# Patient Record
Sex: FEMALE | NOT HISPANIC OR LATINO | Employment: FULL TIME | URBAN - METROPOLITAN AREA
[De-identification: names, ages, dates, MRNs, and addresses within clinical notes are randomized per-mention and may not be internally consistent; named-entity substitution may affect disease eponyms.]

---

## 2017-04-12 ENCOUNTER — RADIANT APPOINTMENT (OUTPATIENT)
Dept: GENERAL RADIOLOGY | Facility: CLINIC | Age: 37
End: 2017-04-12
Attending: FAMILY MEDICINE
Payer: COMMERCIAL

## 2017-04-12 ENCOUNTER — HOSPITAL ENCOUNTER (OUTPATIENT)
Dept: ULTRASOUND IMAGING | Facility: CLINIC | Age: 37
Discharge: HOME | End: 2017-04-12
Attending: FAMILY MEDICINE | Admitting: FAMILY MEDICINE
Payer: COMMERCIAL

## 2017-04-12 ENCOUNTER — OFFICE VISIT (OUTPATIENT)
Dept: FAMILY MEDICINE | Facility: CLINIC | Age: 37
End: 2017-04-12
Payer: COMMERCIAL

## 2017-04-12 VITALS
RESPIRATION RATE: 16 BRPM | OXYGEN SATURATION: 99 % | BODY MASS INDEX: 32.2 KG/M2 | DIASTOLIC BLOOD PRESSURE: 89 MMHG | HEIGHT: 62 IN | TEMPERATURE: 98.4 F | HEART RATE: 66 BPM | WEIGHT: 175 LBS | SYSTOLIC BLOOD PRESSURE: 129 MMHG

## 2017-04-12 DIAGNOSIS — M79.661 PAIN OF RIGHT LOWER LEG: ICD-10-CM

## 2017-04-12 DIAGNOSIS — M54.50 ACUTE BILATERAL LOW BACK PAIN WITHOUT SCIATICA: Primary | ICD-10-CM

## 2017-04-12 DIAGNOSIS — M54.50 ACUTE BILATERAL LOW BACK PAIN WITHOUT SCIATICA: ICD-10-CM

## 2017-04-12 PROBLEM — I83.91: Status: ACTIVE | Noted: 2017-04-12

## 2017-04-12 PROCEDURE — 72100 X-RAY EXAM L-S SPINE 2/3 VWS: CPT

## 2017-04-12 PROCEDURE — 99204 OFFICE O/P NEW MOD 45 MIN: CPT | Performed by: FAMILY MEDICINE

## 2017-04-12 PROCEDURE — 93971 EXTREMITY STUDY: CPT | Mod: RT

## 2017-04-12 RX ORDER — NAPROXEN 500 MG/1
500 TABLET ORAL 2 TIMES DAILY WITH MEALS
Qty: 60 TABLET | Refills: 1 | Status: SHIPPED | OUTPATIENT
Start: 2017-04-12 | End: 2017-09-06

## 2017-04-12 NOTE — NURSING NOTE
"Chief Complaint   Patient presents with     Pain     lower back and right leg pain     New Patient       Initial /89 (BP Location: Right arm, Patient Position: Chair, Cuff Size: Adult Regular)  Pulse 66  Temp 98.4  F (36.9  C) (Oral)  Resp 16  Ht 5' 2\" (1.575 m)  Wt 175 lb (79.4 kg)  SpO2 99%  BMI 32.01 kg/m2 Estimated body mass index is 32.01 kg/(m^2) as calculated from the following:    Height as of this encounter: 5' 2\" (1.575 m).    Weight as of this encounter: 175 lb (79.4 kg).  Medication Reconciliation: complete   Latonya Ruelas, LEENA      "

## 2017-04-12 NOTE — Clinical Note
Please abstract the following data from this visit with this patient into the appropriate field in Epic:  Pap smear done on this date: 2/2016 (approximately), by this group: [facility], results were normal.

## 2017-04-12 NOTE — MR AVS SNAPSHOT
After Visit Summary   4/12/2017    Lorena Conway    MRN: [id number]           Patient Information     Date Of Birth          1980        Visit Information        Provider Department      4/12/2017 10:00 AM Sergo Adams MD [facility]        Today's Diagnoses     Acute bilateral low back pain without sciatica    -  1    Pain of right lower leg           Follow-ups after your visit        Your next 10 appointments already scheduled     Apr 12, 2017  2:00 PM CDT   US LOWER EXTREMITY VENOUS DUPLEX RIGHT with [id number]   [facility] ([facility])    [address]  [facility] [address] [phone]           Please bring a list of your medicines (including vitamins, minerals and over-the-counter drugs). Also, tell your doctor about any allergies you may have. Wear comfortable clothes and leave your valuables at home.  You do not need to do anything special to prepare for your exam.  Please call the Imaging Department at your exam site with any questions.              Future tests that were ordered for you today     Open Future Orders        Priority Expected Expires Ordered    US Lower Extremity Venous Duplex Right STAT  4/12/2018 4/12/2017            Who to contact     If you have questions or need follow up information about today's clinic visit or your schedule please contact [facility] directly at [phone].  Normal or non-critical lab and imaging results will be communicated to you by MyChart, letter or phone within 4 business days after the clinic has received the results. If you do not hear from us within 7 days, please contact the clinic through MyChart or phone. If you have a critical or abnormal lab result, we will notify you by phone as soon as possible.  Submit refill requests through [employer] or call your pharmacy and they will forward the refill request to us. Please allow 3 business days  "for your refill to be completed.          Additional Information About Your Visit        Power Assurehart Information     Inaika lets you send messages to your doctor, view your test results, renew your prescriptions, schedule appointments and more. To sign up, go to [email or web address] . Click on \"Log in\" on the left side of the screen, which will take you to the Welcome page. Then click on \"Sign up Now\" on the right side of the page.     You will be asked to enter the access code listed below, as well as some personal information. Please follow the directions to create your username and password.     Your access code is: [email or web address]  Expires: 2017 11:25 AM     Your access code will  in 90 days. If you need help or a new code, please call your [facility] clinic or [phone].        Care EveryWhere ID     This is your Care EveryWhere ID. This could be used by other organizations to access your [facility] medical records  [id number]        Your Vitals Were     Pulse Temperature Respirations Height Pulse Oximetry BMI (Body Mass Index)    66 98.4  F (36.9  C) (Oral) 16 5' 2\" (1.575 m) 99% 32.01 kg/m2       Blood Pressure from Last 3 Encounters:   17 129/89    Weight from Last 3 Encounters:   17 175 lb (79.4 kg)                 Today's Medication Changes          These changes are accurate as of: 17 11:25 AM.  If you have any questions, ask your nurse or doctor.               Start taking these medicines.        Dose/Directions    naproxen 500 MG tablet   Commonly known as:  NAPROSYN   Used for:  Pain of right lower leg   Started by:  Sergo Adams MD        Dose:  500 mg   Take 1 tablet (500 mg) by mouth 2 times daily (with meals)   Quantity:  60 tablet   Refills:  1            Where to get your medicines      These medications were sent to [id number] - [place], [place] - [address]  [address] [facility] [address]    Hours:  9Am-9Pm (M-F) 9Am-6Pm (S&S) Phone:  [phone] "     naproxen 500 MG tablet                Primary Care Provider    None Specified       No primary provider on file.        Thank you!     Thank you for choosing [facility]  for your care. Our goal is always to provide you with excellent care. Hearing back from our patients is one way we can continue to improve our services. Please take a few minutes to complete the written survey that you may receive in the mail after your visit with us. Thank you!             Your Updated Medication List - Protect others around you: Learn how to safely use, store and throw away your medicines at www.disposemymeds.org.          This list is accurate as of: 4/12/17 11:25 AM.  Always use your most recent med list.                   Brand Name Dispense Instructions for use    naproxen 500 MG tablet    NAPROSYN    60 tablet    Take 1 tablet (500 mg) by mouth 2 times daily (with meals)

## 2017-04-12 NOTE — PROGRESS NOTES
SUBJECTIVE:                                                    Lorena Conway is a 36 year old female who presents to clinic today for the following health issues:      Back Pain      Duration: 2 weeks        Specific cause: none    Description:   Location of pain: low back bilateral  Character of pain: sharp  Pain radiation:radiates into the right leg  New numbness or weakness in legs, not attributed to pain:  no     Intensity: Currently 1/10, At its worst 5/10, moderate    History:   Pain interferes with job: YES  History of back problems: no prior back problems  Any previous MRI or X-rays: None  Pt rememrber about 1.5 year ago, she passed out standing and hit her back on the edge of the bed.  Sees a specialist for back pain:  No  Therapies tried without relief: none    Alleviating factors:   Improved by: heat      Precipitating factors:  Worsened by: Lifting, Bending, Standing and Sitting    Functional and Psychosocial Screen (Tamika STarT Back):      Not performed today       Accompanying Signs & Symptoms:  Risk of Fracture:  None  Risk of Cauda Equina:  None  Risk of Infection:  None  Risk of Cancer:  None  Risk of Ankylosing Spondylitis:  Onset at age <35, male, AND morning back stiffness. no                          Problem list and histories reviewed & adjusted, as indicated.  Additional history: as documented    There is no problem list on file for this patient.    No past surgical history on file.    Social History   Substance Use Topics     Smoking status: Never Smoker     Smokeless tobacco: Never Used     Alcohol use No     History reviewed. No pertinent family history.      No current outpatient prescriptions on file.     No Known Allergies    Reviewed and updated as needed this visit by clinical staff  Tobacco  Allergies  Fam Hx  Soc Hx      Reviewed and updated as needed this visit by Provider         ROS:  C: NEGATIVE for fever, chills, change in weight  NEURO: NEGATIVE for weakness, dizziness or  "paresthesias    OBJECTIVE:                                                    /89 (BP Location: Right arm, Patient Position: Chair, Cuff Size: Adult Regular)  Pulse 66  Temp 98.4  F (36.9  C) (Oral)  Resp 16  Ht 5' 2\" (1.575 m)  Wt 175 lb (79.4 kg)  SpO2 99%  BMI 32.01 kg/m2  Body mass index is 32.01 kg/(m^2).  Patient appears to be in no pain, no antalgic gait noted.   Lumbosacral spine area reveals mild rt side local tenderness or mass.    ROM of the LS spine showed normal.extension nl flexionnl   Straight leg raise is negative at 60 degrees on bilateral.  L4 :toe walk nl patellar reflux nl medial foot sensation nl  L5: dorsiflexion of the big toe nl,mid foot sensation nl  S1: heel walk nl, Quincy reflex nl, lateral sensation of the foot nl     Peripheral pulses are palpable.   Leg : tenderness on the mid section of the calf of the Rt leg.         ASSESSMENT/PLAN:                                                            1. Acute bilateral low back pain without sciatica  Mostly musculoskeletal, given the hx of injury in the past, will refer to   - XR Lumbar Spine 2/3 Views; Future  Meanwhile, start on naprosyn twice daily, and use good back hygiene.    2. Pain of right lower leg  Mostly related to post varicose surgery, will check with   - US Lower Extremity Venous Duplex Right; Future  Follow up with the results, if they are negative, pt to contact her surgeon   - naproxen (NAPROSYN) 500 MG tablet; Take 1 tablet (500 mg) by mouth 2 times daily (with meals)  Dispense: 60 tablet; Refill: 1        Sergo Adams MD  [facility]"

## 2017-09-06 ENCOUNTER — OFFICE VISIT (OUTPATIENT)
Dept: FAMILY MEDICINE | Facility: CLINIC | Age: 37
End: 2017-09-06
Payer: COMMERCIAL

## 2017-09-06 VITALS
SYSTOLIC BLOOD PRESSURE: 117 MMHG | RESPIRATION RATE: 16 BRPM | DIASTOLIC BLOOD PRESSURE: 80 MMHG | OXYGEN SATURATION: 100 % | TEMPERATURE: 98.6 F | WEIGHT: 169 LBS | HEIGHT: 62 IN | HEART RATE: 56 BPM | BODY MASS INDEX: 31.1 KG/M2

## 2017-09-06 DIAGNOSIS — M79.661 PAIN OF RIGHT LOWER LEG: ICD-10-CM

## 2017-09-06 DIAGNOSIS — M72.2 PLANTAR FASCIITIS: Primary | ICD-10-CM

## 2017-09-06 PROBLEM — E66.9 NON MORBID OBESITY: Status: ACTIVE | Noted: 2017-09-06

## 2017-09-06 LAB — D DIMER PPP FEU-MCNC: 0.2 UG/ML FEU (ref 0–0.5)

## 2017-09-06 PROCEDURE — 99214 OFFICE O/P EST MOD 30 MIN: CPT | Performed by: FAMILY MEDICINE

## 2017-09-06 PROCEDURE — 85379 FIBRIN DEGRADATION QUANT: CPT | Performed by: FAMILY MEDICINE

## 2017-09-06 PROCEDURE — 36415 COLL VENOUS BLD VENIPUNCTURE: CPT | Performed by: FAMILY MEDICINE

## 2017-09-06 NOTE — MR AVS SNAPSHOT
"              After Visit Summary   2017    Lorena Conway    MRN: [id number]           Patient Information     Date Of Birth          1980        Visit Information        Provider Department      2017 10:45 AM Sergo Adams MD [facility]        Today's Diagnoses     Plantar fasciitis    -  1    Pain of right lower leg           Follow-ups after your visit        Who to contact     If you have questions or need follow up information about today's clinic visit or your schedule please contact [facility] directly at [phone].  Normal or non-critical lab and imaging results will be communicated to you by PowerGenixhart, letter or phone within 4 business days after the clinic has received the results. If you do not hear from us within 7 days, please contact the clinic through PowerGenixhart or phone. If you have a critical or abnormal lab result, we will notify you by phone as soon as possible.  Submit refill requests through OneCard or call your pharmacy and they will forward the refill request to us. Please allow 3 business days for your refill to be completed.          Additional Information About Your Visit        MyChart Information     OneCard lets you send messages to your doctor, view your test results, renew your prescriptions, schedule appointments and more. To sign up, go to [email or web address] . Click on \"Log in\" on the left side of the screen, which will take you to the Welcome page. Then click on \"Sign up Now\" on the right side of the page.     You will be asked to enter the access code listed below, as well as some personal information. Please follow the directions to create your username and password.     Your access code is: [email or web address]  Expires: 2017 11:10 AM     Your access code will  in 90 days. If you need help or a new code, please call your [facility] or [phone].        Care EveryWhere ID     This is your Care EveryWhere ID. This could be " "used by other organizations to access your [facility] medical records  [id number]        Your Vitals Were     Pulse Temperature Respirations Height Pulse Oximetry BMI (Body Mass Index)    56 98.6  F (37  C) (Oral) 16 5' 2\" (1.575 m) 100% 30.91 kg/m2       Blood Pressure from Last 3 Encounters:   09/06/17 117/80   04/12/17 129/89    Weight from Last 3 Encounters:   09/06/17 169 lb (76.7 kg)   04/12/17 175 lb (79.4 kg)              We Performed the Following     D dimer quantitative          Today's Medication Changes          These changes are accurate as of: 9/6/17 11:10 AM.  If you have any questions, ask your nurse or doctor.               Start taking these medicines.        Dose/Directions    * order for DME   Used for:  Plantar fasciitis   Started by:  Sergo Adams MD        Equipment being ordered: heel cups   Quantity:  1 Device   Refills:  0       * order for DME   Used for:  Pain of right lower leg   Started by:  Sergo Adams MD        Equipment being ordered: compression stocking knee high, medium size, pressure 15 mm/hg.   Quantity:  3 Device   Refills:  11       * Notice:  This list has 2 medication(s) that are the same as other medications prescribed for you. Read the directions carefully, and ask your doctor or other care provider to review them with you.         Where to get your medicines      Some of these will need a paper prescription and others can be bought over the counter.  Ask your nurse if you have questions.     Bring a paper prescription for each of these medications     order for DME    order for DME                Primary Care Provider    None Specified       No primary provider on file.        Equal Access to Services     [facility] AH: Hadcandi baker Soriana, waaxda luqadaha, qaybta kaalmada kendellda, ajit lui. [facility] [phone].    ATENCIÓN: Si habla español, tiene a diaz disposición servicios gratuitos de asistencia lingüística. Llame al " [phone].    We comply with applicable federal civil rights laws and [place] laws. We do not discriminate on the basis of race, color, national origin, age, disability sex, sexual orientation or gender identity.            Thank you!     Thank you for choosing [facility]  for your care. Our goal is always to provide you with excellent care. Hearing back from our patients is one way we can continue to improve our services. Please take a few minutes to complete the written survey that you may receive in the mail after your visit with us. Thank you!             Your Updated Medication List - Protect others around you: Learn how to safely use, store and throw away your medicines at www.disposemymeds.org.          This list is accurate as of: 9/6/17 11:10 AM.  Always use your most recent med list.                   Brand Name Dispense Instructions for use Diagnosis    * order for DME     1 Device    Equipment being ordered: heel cups    Plantar fasciitis       * order for DME     3 Device    Equipment being ordered: compression stocking knee high, medium size, pressure 15 mm/hg.    Pain of right lower leg       * Notice:  This list has 2 medication(s) that are the same as other medications prescribed for you. Read the directions carefully, and ask your doctor or other care provider to review them with you.

## 2017-09-06 NOTE — NURSING NOTE
"Chief Complaint   Patient presents with     RECHECK     right leg pain x3 weeks       Initial /80 (BP Location: Right arm, Patient Position: Chair, Cuff Size: Adult Regular)  Pulse 56  Temp 98.6  F (37  C) (Oral)  Resp 16  Ht 5' 2\" (1.575 m)  Wt 169 lb (76.7 kg)  SpO2 100%  BMI 30.91 kg/m2 Estimated body mass index is 30.91 kg/(m^2) as calculated from the following:    Height as of this encounter: 5' 2\" (1.575 m).    Weight as of this encounter: 169 lb (76.7 kg).  Medication Reconciliation: complete   Latonya Ruelas, LEENA      "

## 2017-09-06 NOTE — PROGRESS NOTES
SUBJECTIVE:   Lorena Conway is a 37 year old female who presents to clinic today for the following health issues:      Foot pain.       Type / Location of Pain: right leg and foot pain.  Analgesia/pain control:       Recent changes:  same      Overall control: Tolerable with discomfort  Activity level/function:      Daily activities:  Able to do light housework, cooking    Work:  Pain does not limit any  work  Adverse effects:  No  Adherance    Taking medication as directed?  n/a    Participating in other treatments: not applicable  Risk Factors:    Sleep:  Good    Mood/anxiety:  controlled    Recent family or social stressors:  none noted    Other aggravating factors: prolonged standing  No flowsheet data found.  No flowsheet data found.  Encounter-Level CSA:     There are no encounter-level csa.            Amount of exercise or physical activity: 4-5 days/week for an average of greater than 60 minutes    Problems taking medications regularly: n/a    Medication side effects: not applicable  Diet: regular (no restrictions)          Problem list and histories reviewed & adjusted, as indicated.  Additional history: as documented    Patient Active Problem List   Diagnosis     Asymptomatic varicose veins, right     History reviewed. No pertinent surgical history.    Social History   Substance Use Topics     Smoking status: Never Smoker     Smokeless tobacco: Never Used     Alcohol use No     History reviewed. No pertinent family history.      Current Outpatient Prescriptions   Medication Sig Dispense Refill     order for DME Equipment being ordered: heel cups 1 Device 0     order for DME Equipment being ordered: compression stocking knee high, medium size, pressure 15 mm/hg. 3 Device 11     No Known Allergies      Reviewed and updated as needed this visit by clinical staffTobacco  Allergies  Med Hx  Surg Hx  Fam Hx  Soc Hx      Reviewed and updated as needed this visit by Provider         ROS:  C: NEGATIVE for  "fever, chills, change in weight  R: NEGATIVE for significant cough or SOB  CV: NEGATIVE for chest pain, palpitations or peripheral edema    OBJECTIVE:     /80 (BP Location: Right arm, Patient Position: Chair, Cuff Size: Adult Regular)  Pulse 56  Temp 98.6  F (37  C) (Oral)  Resp 16  Ht 5' 2\" (1.575 m)  Wt 169 lb (76.7 kg)  SpO2 100%  BMI 30.91 kg/m2  Body mass index is 30.91 kg/(m^2).  GENERAL: healthy, alert and no distress  RESP: lungs clear to auscultation - no rales, rhonchi or wheezes  MS: there is tenderness on the Rt calf area, post surgical hyperpigmented lesions.  SKIN: there is a small callus noticed on the Rt heel.  Mild tenderness over the Rt heel.        ASSESSMENT/PLAN:             1. Plantar fasciitis  Talked about ways to improve symptoms, including avoid walking barefooted at home, use comfortable shoes, weight loss, may use cold massage on the feet at home.  - order for DME; Equipment being ordered: heel cups  Dispense: 1 Device; Refill: 0    2. Pain of right lower leg  Mostly related to venous stasis, will start on compression stockings, also will check for   - D dimer quantitative to rule out DVT, unlikely.  Also talked about elveating the legs   - order for DME; Equipment being ordered: compression stocking knee high, medium size, pressure 15 mm/hg.  Dispense: 3 Device; Refill: 11    Follow up in 30 days if symptoms persist, sooner if symptoms worsen or new ones develops, pt may contact us over the phone for any questions or concerns.      Sergo Adams MD  [facility]"

## 2017-09-06 NOTE — LETTER
September 7, 2017      Lorena Conway  [address] [address]        Dear ,    We are writing to inform you of your test results.    Test for blood clot is negative.      Resulted Orders   D dimer quantitative   Result Value Ref Range    D Dimer 0.2 0.0 - 0.50 ug/ml FEU      Comment:      This D-dimer assay is intended for use in conjuntion with a clinical pretest   probability assessment model to exclude pulmonary embolism (PE) and as an aid   in the diagnosis of deep venous thrombosis (DVT) in outpatients suspected of   PE or DVT. The cut-off value is 0.5 g/mL FEU.         If you have any questions or concerns, please call the clinic at the number listed above.       Sincerely,        Sergo Adams MD

## 2018-01-31 ENCOUNTER — TELEPHONE (OUTPATIENT)
Dept: FAMILY MEDICINE | Facility: CLINIC | Age: 38
End: 2018-01-31

## 2018-01-31 ENCOUNTER — RADIANT APPOINTMENT (OUTPATIENT)
Dept: GENERAL RADIOLOGY | Facility: CLINIC | Age: 38
End: 2018-01-31
Attending: FAMILY MEDICINE
Payer: OTHER MISCELLANEOUS

## 2018-01-31 ENCOUNTER — OFFICE VISIT (OUTPATIENT)
Dept: FAMILY MEDICINE | Facility: CLINIC | Age: 38
End: 2018-01-31
Payer: OTHER MISCELLANEOUS

## 2018-01-31 VITALS
WEIGHT: 170 LBS | RESPIRATION RATE: 16 BRPM | BODY MASS INDEX: 31.28 KG/M2 | TEMPERATURE: 99 F | OXYGEN SATURATION: 100 % | HEIGHT: 62 IN | SYSTOLIC BLOOD PRESSURE: 120 MMHG | HEART RATE: 69 BPM | DIASTOLIC BLOOD PRESSURE: 81 MMHG

## 2018-01-31 DIAGNOSIS — M54.50 ACUTE BILATERAL LOW BACK PAIN WITHOUT SCIATICA: Primary | ICD-10-CM

## 2018-01-31 DIAGNOSIS — M54.50 ACUTE BILATERAL LOW BACK PAIN WITHOUT SCIATICA: ICD-10-CM

## 2018-01-31 PROCEDURE — 99214 OFFICE O/P EST MOD 30 MIN: CPT | Performed by: FAMILY MEDICINE

## 2018-01-31 PROCEDURE — 72100 X-RAY EXAM L-S SPINE 2/3 VWS: CPT

## 2018-01-31 RX ORDER — NAPROXEN 500 MG/1
500 TABLET ORAL 2 TIMES DAILY WITH MEALS
Qty: 60 TABLET | Refills: 0 | Status: SHIPPED | OUTPATIENT
Start: 2018-01-31 | End: 2018-02-26

## 2018-01-31 NOTE — PROGRESS NOTES
SUBJECTIVE:   Lorena Conway is a 37 year old female who presents to clinic today for the following health issues:      Back Pain       Duration: x3 months        Specific cause: work-related, pushing/pulling heavy items, she was lifting milk bossy, on Friday when she felt sever back pain.  She repeated the lifting on Saturday (next day) which made the pain worse.    Description:   Location of pain: low back bilateral  Character of pain: sharp, dull ache, fullness and cramping  Pain radiation:radiates into the right leg and radiates into the left leg  New numbness or weakness in legs, not attributed to pain:  no     Intensity: Currently 6/10, At its worst 10/10, moderate, severe    Some of the time, the pain is sever, but most of the days it is usually tolerable.    History:   Pain interferes with job: YES  History of back problems: no prior back problems  Any previous MRI or X-rays: at the chiropractor    Sees a specialist for back pain:  No  Therapies tried without relief: none    Alleviating factors:   Improved by: chiropractor  Treatment for 12 weeks with no improvement.    Precipitating factors:  Worsened by: Lifting, Bending, Standing and Sitting    Functional and Psychosocial Screen (Tamika STarT Back):      Not performed today          Accompanying Signs & Symptoms:  Risk of Fracture:  None  Risk of Cauda Equina:  None  Risk of Infection:  None  Risk of Cancer:  None  Risk of Ankylosing Spondylitis:  Onset at age <35, male, AND morning back stiffness. no                       Problem list and histories reviewed & adjusted, as indicated.  Additional history: as documented    Patient Active Problem List   Diagnosis     Asymptomatic varicose veins, right     Plantar fasciitis     Non morbid obesity     History reviewed. No pertinent surgical history.    Social History   Substance Use Topics     Smoking status: Never Smoker     Smokeless tobacco: Never Used     Alcohol use No     History reviewed. No pertinent  "family history.      Current Outpatient Prescriptions   Medication Sig Dispense Refill     naproxen (NAPROSYN) 500 MG tablet Take 1 tablet (500 mg) by mouth 2 times daily (with meals) 60 tablet 0       Reviewed and updated as needed this visit by clinical staff  Tobacco  Allergies  Meds  Med Hx  Surg Hx  Fam Hx  Soc Hx      Reviewed and updated as needed this visit by Provider         ROS:  C: NEGATIVE for fever, chills, change in weight  CV: NEGATIVE for chest pain, palpitations or peripheral edema    OBJECTIVE:     /81 (BP Location: Right arm, Patient Position: Chair, Cuff Size: Adult Large)  Pulse 69  Temp 99  F (37.2  C) (Oral)  Resp 16  Ht 5' 2\" (1.575 m)  Wt 170 lb (77.1 kg)  SpO2 100%  BMI 31.09 kg/m2  Body mass index is 31.09 kg/(m^2).  Patient appears to be in no pain, no antalgic gait noted.   Lumbosacral spine area reveals mild lower  local tenderness but no mass.    ROM of the LS spine showed normal.extension nl flexionnl   Straight leg raise is negative at 60 degrees on bilateral.  L4 :toe walk nlpatellar reflux nl medial foot sensation nl  L5: dorsiflexion of the big toe nl,mid foot sensation nl  S1: heel walk nl, Hardesty reflex not done, lateral sensation of the foot nl     Peripheral pulses are palpable.           ASSESSMENT/PLAN:             1. Acute bilateral low back pain without sciatica  Pain is not improving, will start on   - naproxen (NAPROSYN) 500 MG tablet; Take 1 tablet (500 mg) by mouth 2 times daily (with meals)  Dispense: 60 tablet; Refill: 0  - XR Lumbar Spine 2/3 Views; Future  Refer to   - [facility] PT, HAND, AND CHIROPRACTIC REFERRAL    Follow up in 30 days if symptoms persist, sooner if symptoms worsen or new ones develops, pt may contact us over the phone for any questions or concerns.        Sergo Adams MD  [facility]"

## 2018-01-31 NOTE — LETTER
[address] [address]  Phone: [phone]    January 31, 2018        Lorena Conway  [address]  [place] [facility] [facility] [address]          To whom it may concern:    RE: Lorena Conway    Patient was seen and treated today at our clinic.  Patient may return to work  with the following:  Light duty-unable to bend or twist, no lifting more than 10 pounds, no pushing or pulling more than 10 pounds.  When the patient returns to work, the following restrictions apply until 3/2/2018      Please contact me for questions or concerns.      Sincerely,        Sergo Adams MD

## 2018-01-31 NOTE — TELEPHONE ENCOUNTER
Pt calls, saw AA this am for w/c back issue, informs AA needs to cancel PT order per work comp adjustor, pt just completed 12 weeks of chiropractor so they won't pay for more, pt will call and cancel PT appointment next week, AA please cancel PT order if agree, w/c wants AA to order back specialist referral, aware AA maybe out not, routed, inform pt when final      Telephone Information:   Mobile [phone]     Tiffany Rashid RN, BSN  Message handled by Nurse Triage.

## 2018-01-31 NOTE — TELEPHONE ENCOUNTER
Oh no.   Ok, will refer to back specialist, I put the order in, they should be calling the patient.

## 2018-01-31 NOTE — MR AVS SNAPSHOT
After Visit Summary   1/31/2018    Lorena Conway    MRN: [id number]           Patient Information     Date Of Birth          1980        Visit Information        Provider Department      1/31/2018 10:00 AM Sergo Adams MD [facility]        Today's Diagnoses     Acute bilateral low back pain without sciatica    -  1       Follow-ups after your visit        Additional Services     [facility] PT, HAND, AND CHIROPRACTIC REFERRAL       **This order will print in the [facility] Scheduling Office**    Physical Therapy, Hand Therapy and Chiropractic Care are available through:    *[facility]  *[facility]  *[facility]    Call one number to schedule at any of the above locations: [phone].    Your provider has referred you to: Physical Therapy at [facility] or [facility]    Indication/Reason for Referral: low back pain.  Onset of Illness: 3 months ago.  Therapy Orders: Evaluate and Treat  Special Programs: None  Special Request: None    Tamika Lundberg      Additional Comments for the Therapist or Chiropractor:     Please be aware that coverage of these services is subject to the terms and limitations of your health insurance plan.  Call member services at your health plan with any benefit or coverage questions.      Please bring the following to your appointment:    *Your personal calendar for scheduling future appointments  *Comfortable clothing                  Future tests that were ordered for you today     Open Future Orders        Priority Expected Expires Ordered    XR Lumbar Spine 2/3 Views Routine 1/31/2018 1/31/2019 1/31/2018            Who to contact     If you have questions or need follow up information about today's clinic visit or your schedule please contact [facility] directly at [phone].  Normal or non-critical lab and imaging results will be communicated to you by MyChart, letter or phone within 4 business days  "after the clinic has received the results. If you do not hear from us within 7 days, please contact the clinic through [facility] or phone. If you have a critical or abnormal lab result, we will notify you by phone as soon as possible.  Submit refill requests through [employer] or call your pharmacy and they will forward the refill request to us. Please allow 3 business days for your refill to be completed.          Additional Information About Your Visit        [facility] Information     [facility] lets you send messages to your doctor, view your test results, renew your prescriptions, schedule appointments and more. To sign up, go to [email or web address] . Click on \"Log in\" on the left side of the screen, which will take you to the Welcome page. Then click on \"Sign up Now\" on the right side of the page.     You will be asked to enter the access code listed below, as well as some personal information. Please follow the directions to create your username and password.     Your access code is: [email or web address]  Expires: 2018 10:35 AM     Your access code will  in 90 days. If you need help or a new code, please call your [place] clinic or [phone].        Care EveryWhere ID     This is your Care EveryWhere ID. This could be used by other organizations to access your [place] medical records  [id number]        Your Vitals Were     Pulse Temperature Respirations Height Pulse Oximetry BMI (Body Mass Index)    69 99  F (37.2  C) (Oral) 16 5' 2\" (1.575 m) 100% 31.09 kg/m2       Blood Pressure from Last 3 Encounters:   18 120/81   17 117/80   17 129/89    Weight from Last 3 Encounters:   18 170 lb (77.1 kg)   17 169 lb (76.7 kg)   17 175 lb (79.4 kg)              We Performed the Following     IMER PT, HAND, AND CHIROPRACTIC REFERRAL          Today's Medication Changes          These changes are accurate as of 18 10:35 AM.  If you have any questions, ask your nurse or doctor.    "            Start taking these medicines.        Dose/Directions    naproxen 500 MG tablet   Commonly known as:  NAPROSYN   Used for:  Acute bilateral low back pain without sciatica   Started by:  Sergo Adams MD        Dose:  500 mg   Take 1 tablet (500 mg) by mouth 2 times daily (with meals)   Quantity:  60 tablet   Refills:  0            Where to get your medicines      These medications were sent to [facility]/pharmacy #[id number] - [place], [place] - [address]  [address], [facility] [address]     Phone:  [phone]     naproxen 500 MG tablet                Primary Care Provider Office Phone # Fax #    Sergo Adams [phone] [address] [facility] [address]        Equal Access to Services     [facility]: Marlyn remy hadalfredoo Soriana, waaxda luqadaha, qaybta kaalmada adeegyada, waxsilvino jimenesin jose smith . So [facility] [phone].    ATENCIÓN: Si habla español, tiene a diaz disposición servicios gratuitos de asistencia lingüística. [facility] [phone].    We comply with applicable federal civil rights laws and [place] laws. We do not discriminate on the basis of race, color, national origin, age, disability, sex, sexual orientation, or gender identity.            Thank you!     Thank you for choosing [facility]  for your care. Our goal is always to provide you with excellent care. Hearing back from our patients is one way we can continue to improve our services. Please take a few minutes to complete the written survey that you may receive in the mail after your visit with us. Thank you!             Your Updated Medication List - Protect others around you: Learn how to safely use, store and throw away your medicines at www.disposemymeds.org.          This list is accurate as of 1/31/18 10:35 AM.  Always use your most recent med list.                   Brand Name Dispense Instructions for use Diagnosis    naproxen 500 MG tablet    NAPROSYN    60  tablet    Take 1 tablet (500 mg) by mouth 2 times daily (with meals)    Acute bilateral low back pain without sciatica

## 2018-02-02 ENCOUNTER — TRANSFERRED RECORDS (OUTPATIENT)
Dept: HEALTH INFORMATION MANAGEMENT | Facility: CLINIC | Age: 38
End: 2018-02-02

## 2018-02-14 ENCOUNTER — TRANSFERRED RECORDS (OUTPATIENT)
Dept: HEALTH INFORMATION MANAGEMENT | Facility: CLINIC | Age: 38
End: 2018-02-14

## 2018-02-26 DIAGNOSIS — M54.50 ACUTE BILATERAL LOW BACK PAIN WITHOUT SCIATICA: ICD-10-CM

## 2018-02-26 NOTE — TELEPHONE ENCOUNTER
"Requested Prescriptions   Pending Prescriptions Disp Refills     naproxen (NAPROSYN) 500 MG tablet [Pharmacy Med Name: NAPROXEN 500 MG TABLET]  Medication may not be due for refill  Last Written Prescription Date:  1/31/2018  Last Fill Quantity: 60 tablet,  # refills: 0   Last office visit: 1/31/2018 with prescribing provider:  Bryan    60 tablet 0     Sig: TAKE 1 TABLET (500 MG) BY MOUTH 2 TIMES DAILY (WITH MEALS)    NSAID Medications Failed    2/26/2018  5:05 PM       Failed - Normal ALT on file in past 12 months    No lab results found.         Failed - Normal AST on file in past 12 months    No lab results found.         Failed - Normal CBC on file in past 12 months    Recent Labs   Lab Test  06/12/11   2135   WBC  13.1*   RBC  4.10   HGB  11.1*   HCT  34.7*   PLT  267          Failed - Normal serum creatinine on file in past 12 months    No lab results found.         Failed - No positive pregnancy test in past 12 months       Passed - Blood pressure under 140/90 in past 12 months    BP Readings from Last 3 Encounters:   01/31/18 120/81   09/06/17 117/80   04/12/17 129/89          Passed - Recent or future visit with authorizing provider's specialty    Patient had office visit in the last year or has a visit in the next 30 days with authorizing provider.  See \"Patient Info\" tab in inbasket, or \"Choose Columns\" in Meds & Orders section of the refill encounter.        Passed - Patient is age 6-64 years       Passed - No active pregnancy on record          "

## 2018-03-01 RX ORDER — NAPROXEN 500 MG/1
TABLET ORAL
Status: SHIPPED
Start: 2018-03-01 | End: 2023-07-25

## 2018-03-01 NOTE — TELEPHONE ENCOUNTER
Routing refill request to provider to review approval because:  ?ongoing  Tiffany Rashid RN, BSN  Message handled by Nurse Triage.

## 2018-03-01 NOTE — TELEPHONE ENCOUNTER
Can we call patient, why does she need them?  Sergo Adams MD  [facility]  [phone]  r   ambulatory

## 2018-03-02 NOTE — TELEPHONE ENCOUNTER
Sent denial to pharmacy to have pt call us if needed, maybe auto fill  Tiffany Rashid RN, BSN  Message handled by Nurse Triage.

## 2018-04-30 ENCOUNTER — TRANSFERRED RECORDS (OUTPATIENT)
Dept: HEALTH INFORMATION MANAGEMENT | Facility: CLINIC | Age: 38
End: 2018-04-30

## 2018-05-24 ENCOUNTER — TRANSFERRED RECORDS (OUTPATIENT)
Dept: HEALTH INFORMATION MANAGEMENT | Facility: CLINIC | Age: 38
End: 2018-05-24

## 2018-06-03 ENCOUNTER — HOSPITAL ENCOUNTER (EMERGENCY)
Facility: CLINIC | Age: 38
Discharge: HOME | End: 2018-06-03
Attending: EMERGENCY MEDICINE | Admitting: EMERGENCY MEDICINE
Payer: OTHER MISCELLANEOUS

## 2018-06-03 VITALS
RESPIRATION RATE: 20 BRPM | OXYGEN SATURATION: 95 % | TEMPERATURE: 98.5 F | DIASTOLIC BLOOD PRESSURE: 66 MMHG | HEART RATE: 78 BPM | SYSTOLIC BLOOD PRESSURE: 108 MMHG

## 2018-06-03 DIAGNOSIS — M54.5 ACUTE BILATERAL LOW BACK PAIN, WITH SCIATICA PRESENCE UNSPECIFIED: ICD-10-CM

## 2018-06-03 LAB — B-HCG FREE SERPL-ACNC: <5 IU/L

## 2018-06-03 PROCEDURE — 99285 EMERGENCY DEPT VISIT HI MDM: CPT | Mod: 25

## 2018-06-03 PROCEDURE — 96375 TX/PRO/DX INJ NEW DRUG ADDON: CPT

## 2018-06-03 PROCEDURE — 25000128 H RX IP 250 OP 636: Performed by: EMERGENCY MEDICINE

## 2018-06-03 PROCEDURE — 96374 THER/PROPH/DIAG INJ IV PUSH: CPT

## 2018-06-03 PROCEDURE — 84702 CHORIONIC GONADOTROPIN TEST: CPT

## 2018-06-03 RX ORDER — OXYCODONE HYDROCHLORIDE 5 MG/1
5-10 TABLET ORAL EVERY 6 HOURS PRN
Qty: 15 TABLET | Refills: 0 | Status: SHIPPED | OUTPATIENT
Start: 2018-06-03 | End: 2023-07-25

## 2018-06-03 RX ORDER — KETOROLAC TROMETHAMINE 15 MG/ML
15 INJECTION, SOLUTION INTRAMUSCULAR; INTRAVENOUS ONCE
Status: COMPLETED | OUTPATIENT
Start: 2018-06-03 | End: 2018-06-03

## 2018-06-03 RX ORDER — DIAZEPAM 10 MG/2ML
5 INJECTION, SOLUTION INTRAMUSCULAR; INTRAVENOUS ONCE
Status: COMPLETED | OUTPATIENT
Start: 2018-06-03 | End: 2018-06-03

## 2018-06-03 RX ADMIN — DIAZEPAM 5 MG: 5 INJECTION, SOLUTION INTRAMUSCULAR; INTRAVENOUS at 13:35

## 2018-06-03 RX ADMIN — KETOROLAC TROMETHAMINE 15 MG: 15 INJECTION, SOLUTION INTRAMUSCULAR; INTRAVENOUS at 13:34

## 2018-06-03 RX ADMIN — HYDROMORPHONE HYDROCHLORIDE 1 MG: 1 INJECTION, SOLUTION INTRAMUSCULAR; INTRAVENOUS; SUBCUTANEOUS at 12:22

## 2018-06-03 ASSESSMENT — ENCOUNTER SYMPTOMS
FEVER: 0
VOMITING: 0
NUMBNESS: 1
MYALGIAS: 0
JOINT SWELLING: 0
BACK PAIN: 1
DIZZINESS: 0
NAUSEA: 0
WEAKNESS: 0

## 2018-06-03 NOTE — ED AVS SNAPSHOT
[facility] Emergency Department    [address]    [facility] [address]    Phone:  [phone]    Fax:  [phone]                                       Lorena Conway   MRN: [id number]    Department:  [facility] Emergency Department   Date of Visit:  6/3/2018           After Visit Summary Signature Page     I have received my discharge instructions, and my questions have been answered. I have discussed any challenges I see with this plan with the nurse or doctor.    ..........................................................................................................................................  Patient/Patient Representative Signature      ..........................................................................................................................................  Patient Representative Print Name and Relationship to Patient    ..................................................               ................................................  Date                                            Time    ..........................................................................................................................................  Reviewed by Signature/Title    ...................................................              ..............................................  Date                                                            Time

## 2018-06-03 NOTE — ED TRIAGE NOTES
Back pain since November- worse x 1 week. Patient states she is having difficulty walking because of the pain. ABC intact alert and no distress.

## 2018-06-03 NOTE — DISCHARGE INSTRUCTIONS
Please make an appointment to follow up with [facility] [phone] as soon as possible even if entirely better.    Discharge Instructions  Back Pain  You were seen today for back pain. Back pain can have many causes, but most will get better without surgery or other specific treatment. Sometimes there is a herniated ( slipped ) disc. We do not usually do MRI scans to look for these right away, since most herniated discs will get better on their own with time.  Today, we did not find any evidence that your back pain was caused by a serious condition. However, sometimes symptoms develop over time and cannot be found during an emergency visit, so it is very important that you follow up with your primary provider.  Generally, every Emergency Department visit should have a follow-up clinic visit with either a primary or a specialty clinic/provider. Please follow-up as instructed by your emergency provider today.    Return to the Emergency Department if:    You develop a fever with your back pain.     You have weakness or change in sensation in one or both legs.    You lose control of your bowels or bladder, or cannot empty your bladder (cannot pee).    Your pain gets much worse.     Follow-up with your provider:    Unless your pain has completely gone away, please make an appointment with your provider within one week. Most of the routine care for back pain is available in a clinic and not the Emergency Department. You may need further management of your back pain, such as more pain medication, imaging such as an X-ray or MRI, or physical therapy.    What can I do to help myself?    Remain Active -- People are often afraid that they will hurt their back further or delay recovery by remaining active, but this is one of the best things you can do for your back. In fact, staying in bed for a long time to rest is not recommended. Studies have shown that people with low back pain recover faster when they remain  active. Movement helps to bring blood flow to the muscles and relieve muscle spasms as well as preventing loss of muscle strength.    Heat -- Using a heating pad can help with low back pain during the first few weeks. Do not sleep with a heating pad, as you can be burned.     Pain medications - You may take a pain medication such as Tylenol  (acetaminophen), Advil , Motrin  (ibuprofen) or Aleve  (naproxen).  If you were given a prescription for medicine here today, be sure to read all of the information (including the package insert) that comes with your prescription.  This will include important information about the medicine, its side effects, and any warnings that you need to know about.  The pharmacist who fills the prescription can provide more information and answer questions you may have about the medicine.  If you have questions or concerns that the pharmacist cannot address, please call or return to the Emergency Department.   Remember that you can always come back to the Emergency Department if you are not able to see your regular provider in the amount of time listed above, if you get any new symptoms, or if there is anything that worries you.

## 2018-06-03 NOTE — ED PROVIDER NOTES
History     Chief Complaint:  Back Pain    HPI   Lorena Conway is a 37 year old female who presents with back pain. The patient reports that she suffered an injury to her back last November when working and since has been evaluated multiple time as an outpatient and had an outpatient MRI. This showed L4 and L5 disc protrusion. Since approximately 5/7 the patient's back pain has been experiencing worse than usual chronic back pain that has worsened this week. She states it now shoots down her bilateral legs and stops at her knees. Her pain is exacerbated by bending or moving of any nature, as well as when she voids her bladder/bowels. She has been taking Naproxen and Cyclobenzaprine as prescribed but this has not been giving her any relief since 5/7. She has had steroid injections with remarkable improvement and taken oral steroids in the past but these do not help her long term. She denies any fevers, history of diabetes, history of dialysis, incontinence, new numbness/tingling, dysuria, hematuria, or any other symptoms.    Allergies:  No known drug allergies.    Medications:    Naproxen  Cyclobenzaprine    Past Medical History:    Plantar fascitis  Asymptomatic varicose veins  Chronic back pain    Past Surgical History:    No pertinent past surgical history.    Family History:    No pertinent family history.    Social History:  Smoking status: Never smoker  Alcohol use: No  Marital Status:        Review of Systems   Constitutional: Negative for fever.   Gastrointestinal: Negative for nausea and vomiting.   Musculoskeletal: Positive for back pain. Negative for gait problem, joint swelling and myalgias.   Neurological: Positive for numbness. Negative for dizziness and weakness.   All other systems reviewed and are negative.    Physical Exam     Patient Vitals for the past 24 hrs:   BP Temp Temp src Pulse Resp SpO2   06/03/18 1345 108/66 - - - - 95 %   06/03/18 1330 124/81 - - - - 98 %   06/03/18 1315 112/78 - -  - - 95 %   18 1300 119/81 - - - - 96 %   18 1245 114/73 - - - - 97 %   18 1230 105/71 - - - - 96 %   18 1128 121/90 98.5  F (36.9  C) Oral 78 20 100 %         Physical Exam    General:  Pleasant, age appropriate.  HEENT:   Conjunctiva are normal and without erythema.    NECK:   Supple, no meningismus.     CV:    Regular rate and rhythm     No murmurs, rubs or gallops.      2+ dorsalis pedis pulses bilateral.  PULM:   Clear to auscultation bilateral.      No respiratory distress.  No stridor.  ABD:   Soft, non-tender, non-distendend.      No rebound or guarding.  MSK:   Patient is non-tender over the lumbar spine.      Non-tender at the lumbar paraspinal musculature.      No step-off to the bony spine or overlying lesions.     Limited ROM about the trunk secondary to pain.  LYMPH:  No cervical lymphadenopathy.  NEURO:  Strength is 5/5 and sensation is intact to the lower extremities bilateral.        2+ patellar tendon reflexes bilateral.      No clonus and down-going Babinski bilateral.  SKIN:   Warm, dry and intact.    PYSCH:   Mood is good and affect is appropriate.      Emergency Department Course   Interventions:  (1222) Dilaudid, 1 mg, IV injection  (1335) Valium 5 mg, PO  (1335) Toradol, 15 mg, IV injection    Emergency Department Course:  Nursing notes and vitals reviewed.  (1136) I performed an exam of the patient as documented above.    Findings and plan explained to the patient. Patient discharged home with instructions regarding supportive care, medications, and reasons to return. The importance of close follow-up was reviewed. The patient was prescribed Oxycodone.  Impression & Plan    Medical Decision Makin-year-old female with a history of lumbar disc pathology presents to the ED with acute worsening of her low back pain.  On examination she has no findings for cauda equina syndrome.  No features to draw concern for epidural abscess, epidural hematoma or discitis.   Symptoms are likely related to lumbar radicular pain.  Patient's pain remarkably improved in the ED.  She is able to ambulate in the ED.  She petra not improved on NSAIDs or muscle relaxers.  Short-term supply of oxycodone provided.  Close follow-up with [facility] orthopedics who follows her for her chronic back pain issues.    Diagnosis:    ICD-10-CM   1. Acute bilateral low back pain, with sciatica presence unspecified M54.5       Disposition:  Patient is discharged to home.      Discharge Medications:  New Prescriptions    OXYCODONE IR (ROXICODONE) 5 MG TABLET    Take 1-2 tablets (5-10 mg) by mouth every 6 hours as needed for pain         IFranklin, am serving as a scribe on 6/3/2018 at 11:36 AM to personally document services performed by Dr. Mae based on my observations and the provider's statements to me.         Franklin Abbott  6/3/2018   [facility] EMERGENCY DEPARTMENT       Enzo Mae MD  06/03/18 1523

## 2018-06-03 NOTE — LETTER
Jannet 3, 2018      To Whom It May Concern:      Lorena Conway was seen in our Emergency Department today, 06/03/18.  She may return to work on 6/7/18 unless her orthopedic surgeon needs to extend her leave from work.    Sincerely,        Enzo Mae MD

## 2018-06-03 NOTE — ED AVS SNAPSHOT
[facility] Emergency Department    [address]    [facility] [address]    Phone:  [phone]    Fax:  [phone]                                       Lorena Conway   MRN: [id number]    Department:  [facility] Emergency Department   Date of Visit:  6/3/2018           Patient Information     Date Of Birth          1980        Your diagnoses for this visit were:     Acute bilateral low back pain, with sciatica presence unspecified        You were seen by Enzo Mae MD.        Discharge Instructions       Please make an appointment to follow up with [facility] [phone] as soon as possible even if entirely better.    Discharge Instructions  Back Pain  You were seen today for back pain. Back pain can have many causes, but most will get better without surgery or other specific treatment. Sometimes there is a herniated ( slipped ) disc. We do not usually do MRI scans to look for these right away, since most herniated discs will get better on their own with time.  Today, we did not find any evidence that your back pain was caused by a serious condition. However, sometimes symptoms develop over time and cannot be found during an emergency visit, so it is very important that you follow up with your primary provider.  Generally, every Emergency Department visit should have a follow-up clinic visit with either a primary or a specialty clinic/provider. Please follow-up as instructed by your emergency provider today.    Return to the Emergency Department if:    You develop a fever with your back pain.     You have weakness or change in sensation in one or both legs.    You lose control of your bowels or bladder, or cannot empty your bladder (cannot pee).    Your pain gets much worse.     Follow-up with your provider:    Unless your pain has completely gone away, please make an appointment with your provider within one week. Most of the routine care for back  pain is available in a clinic and not the Emergency Department. You may need further management of your back pain, such as more pain medication, imaging such as an X-ray or MRI, or physical therapy.    What can I do to help myself?    Remain Active -- People are often afraid that they will hurt their back further or delay recovery by remaining active, but this is one of the best things you can do for your back. In fact, staying in bed for a long time to rest is not recommended. Studies have shown that people with low back pain recover faster when they remain active. Movement helps to bring blood flow to the muscles and relieve muscle spasms as well as preventing loss of muscle strength.    Heat -- Using a heating pad can help with low back pain during the first few weeks. Do not sleep with a heating pad, as you can be burned.     Pain medications - You may take a pain medication such as Tylenol  (acetaminophen), Advil , Motrin  (ibuprofen) or Aleve  (naproxen).  If you were given a prescription for medicine here today, be sure to read all of the information (including the package insert) that comes with your prescription.  This will include important information about the medicine, its side effects, and any warnings that you need to know about.  The pharmacist who fills the prescription can provide more information and answer questions you may have about the medicine.  If you have questions or concerns that the pharmacist cannot address, please call or return to the Emergency Department.   Remember that you can always come back to the Emergency Department if you are not able to see your regular provider in the amount of time listed above, if you get any new symptoms, or if there is anything that worries you.        24 Hour Appointment Hotline       To make an appointment at any [facility], call [phone] ([phone]). If you don't have a family doctor or clinic, we will help you find one. [facility]  clin[facility] are conveniently located to serve the needs of you and your family.             Review of your medicines      START taking        Dose / Directions Last dose taken    oxyCODONE IR 5 MG tablet   Commonly known as:  ROXICODONE   Dose:  5-10 mg   Quantity:  15 tablet        Take 1-2 tablets (5-10 mg) by mouth every 6 hours as needed for pain   Refills:  0          Our records show that you are taking the medicines listed below. If these are incorrect, please call your family doctor or clinic.        Dose / Directions Last dose taken    naproxen 500 MG tablet   Commonly known as:  NAPROSYN        TAKE 1 TABLET (500 MG) BY MOUTH 2 TIMES DAILY (WITH MEALS)   Refills:  0                Information about OPIOIDS     PRESCRIPTION OPIOIDS: WHAT YOU NEED TO KNOW   You have a prescription for an opioid (narcotic) pain medicine. Opioids can cause addiction. If you have a history of chemical dependency of any type, you are at a higher risk of becoming addicted to opioids. Only take this medicine after all other options have been tried. Take it for as short a time and as few doses as possible.     Do not:    Drive. If you drive while taking these medicines, you could be arrested for driving under the influence (DUI).    Operate heavy machinery    Do any other dangerous activities while taking these medicines.     Drink any alcohol while taking these medicines.      Take with any other medicines that contain acetaminophen. Read all labels carefully. Look for the word  acetaminophen  or  Tylenol.  Ask your pharmacist if you have questions or are unsure.    Store your pills in a secure place, locked if possible. We will not replace any lost or stolen medicine. If you don t finish your medicine, please throw away (dispose) as directed by your pharmacist. The [place] [employer] has more information about safe disposal: https://www.pca.state.[place]/living-green/managing-unwanted-medications    All opioids tend to  cause constipation. Drink plenty of water and eat foods that have a lot of fiber, such as fruits, vegetables, prune juice, apple juice and high-fiber cereal. Take a laxative (Miralax, milk of magnesia, Colace, Senna) if you don t move your bowels at least every other day.         Prescriptions were sent or printed at these locations (1 Prescription)                   Other Prescriptions                Printed at Department/Unit printer (1 of 1)         oxyCODONE IR (ROXICODONE) 5 MG tablet                Procedures and tests performed during your visit     ISTAT HCG Quantitative Pregnancy Nursing POCT    ISTAT HCG Quantitative Pregnancy POCT    Peripheral IV catheter      Orders Needing Specimen Collection     None      Pending Results     No orders found from 6/1/2018 to 6/4/2018.            Pending Culture Results     No orders found from 6/1/2018 to 6/4/2018.            Pending Results Instructions     If you had any lab results that were not finalized at the time of your Discharge, you can call the ED Lab Result RN at [phone]. You will be contacted by this team for any positive Lab results or changes in treatment. The nurses are available 7 days a week from 10A to 6:30P.  You can leave a message 24 hours per day and they will return your call.        Test Results From Your Hospital Stay        6/3/2018 12:34 PM      Component Results     Component Value Ref Range & Units Status    HCG Quantitative Serum <5.0 <5.0 IU/L Final                Clinical Quality Measure: Blood Pressure Screening     Your blood pressure was checked while you were in the emergency department today. The last reading we obtained was  BP: 108/66 . Please read the guidelines below about what these numbers mean and what you should do about them.  If your systolic blood pressure (the top number) is less than 120 and your diastolic blood pressure (the bottom number) is less than 80, then your blood pressure is normal. There is nothing more  "that you need to do about it.  If your systolic blood pressure (the top number) is 120-139 or your diastolic blood pressure (the bottom number) is 80-89, your blood pressure may be higher than it should be. You should have your blood pressure rechecked within a year by a primary care provider.  If your systolic blood pressure (the top number) is 140 or greater or your diastolic blood pressure (the bottom number) is 90 or greater, you may have high blood pressure. High blood pressure is treatable, but if left untreated over time it can put you at risk for heart attack, stroke, or kidney failure. You should have your blood pressure rechecked by a primary care provider within the next 4 weeks.  If your provider in the emergency department today gave you specific instructions to follow-up with your doctor or provider even sooner than that, you should follow that instruction and not wait for up to 4 weeks for your follow-up visit.        Thank you for choosing [facility]       Thank you for choosing [facility] for your care. Our goal is always to provide you with excellent care. Hearing back from our patients is one way we can continue to improve our services. Please take a few minutes to complete the written survey that you may receive in the mail after you visit with us. Thank you!        [employer] Information     [employer] lets you send messages to your doctor, view your test results, renew your prescriptions, schedule appointments and more. To sign up, go to [email or web address] . Click on \"Log in\" on the left side of the screen, which will take you to the Welcome page. Then click on \"Sign up Now\" on the right side of the page.     You will be asked to enter the access code listed below, as well as some personal information. Please follow the directions to create your username and password.     Your access code is: [email or web address]  Expires: 2018  2:32 PM     Your access code will  in 90 days. If you need help or a " new code, please call your [place] [facility] clinic or [phone].        Care EveryWhere ID     This is your Care EveryWhere ID. This could be used by other organizations to access your [facility] medical records  [id number]        Equal Access to Services     TANVIR PANDYA : Marlyn Matta, wadominic luqadaha, qarosita kaalmadevin alba, ajit lui. So [facility] [phone].    ATENCIÓN: Si habla español, tiene a diaz disposición servicios gratuitos de asistencia lingüística. Llame al [phone].    We comply with applicable federal civil rights laws and [place] laws. We do not discriminate on the basis of race, color, national origin, age, disability, sex, sexual orientation, or gender identity.            After Visit Summary       This is your record. Keep this with you and show to your community pharmacist(s) and doctor(s) at your next visit.

## 2018-06-25 ENCOUNTER — TRANSFERRED RECORDS (OUTPATIENT)
Dept: HEALTH INFORMATION MANAGEMENT | Facility: CLINIC | Age: 38
End: 2018-06-25

## 2018-08-07 ENCOUNTER — TRANSFERRED RECORDS (OUTPATIENT)
Dept: HEALTH INFORMATION MANAGEMENT | Facility: CLINIC | Age: 38
End: 2018-08-07

## 2018-11-02 ENCOUNTER — APPOINTMENT (OUTPATIENT)
Dept: GENERAL RADIOLOGY | Facility: CLINIC | Age: 38
End: 2018-11-02
Attending: EMERGENCY MEDICINE
Payer: OTHER MISCELLANEOUS

## 2018-11-02 ENCOUNTER — APPOINTMENT (OUTPATIENT)
Dept: ULTRASOUND IMAGING | Facility: CLINIC | Age: 38
End: 2018-11-02
Attending: EMERGENCY MEDICINE
Payer: OTHER MISCELLANEOUS

## 2018-11-02 ENCOUNTER — HOSPITAL ENCOUNTER (EMERGENCY)
Facility: CLINIC | Age: 38
Discharge: HOME | End: 2018-11-02
Attending: EMERGENCY MEDICINE | Admitting: EMERGENCY MEDICINE
Payer: OTHER MISCELLANEOUS

## 2018-11-02 VITALS
HEART RATE: 69 BPM | HEIGHT: 63 IN | OXYGEN SATURATION: 98 % | SYSTOLIC BLOOD PRESSURE: 123 MMHG | RESPIRATION RATE: 18 BRPM | WEIGHT: 180 LBS | BODY MASS INDEX: 31.89 KG/M2 | DIASTOLIC BLOOD PRESSURE: 80 MMHG | TEMPERATURE: 98 F

## 2018-11-02 DIAGNOSIS — M79.661 PAIN OF RIGHT LOWER LEG: ICD-10-CM

## 2018-11-02 PROCEDURE — 99284 EMERGENCY DEPT VISIT MOD MDM: CPT | Mod: 25

## 2018-11-02 PROCEDURE — 96372 THER/PROPH/DIAG INJ SC/IM: CPT

## 2018-11-02 PROCEDURE — 73502 X-RAY EXAM HIP UNI 2-3 VIEWS: CPT

## 2018-11-02 PROCEDURE — 25000132 ZZH RX MED GY IP 250 OP 250 PS 637: Performed by: EMERGENCY MEDICINE

## 2018-11-02 PROCEDURE — 93971 EXTREMITY STUDY: CPT | Mod: RT

## 2018-11-02 PROCEDURE — 25000131 ZZH RX MED GY IP 250 OP 636 PS 637: Performed by: EMERGENCY MEDICINE

## 2018-11-02 PROCEDURE — 25000128 H RX IP 250 OP 636: Performed by: EMERGENCY MEDICINE

## 2018-11-02 RX ORDER — METHYLPREDNISOLONE 4 MG
TABLET, DOSE PACK ORAL
Qty: 21 TABLET | Refills: 0 | Status: SHIPPED | OUTPATIENT
Start: 2018-11-02 | End: 2023-07-25

## 2018-11-02 RX ORDER — HYDROCODONE BITARTRATE AND ACETAMINOPHEN 5; 325 MG/1; MG/1
1 TABLET ORAL EVERY 4 HOURS PRN
Qty: 8 TABLET | Refills: 0 | Status: SHIPPED | OUTPATIENT
Start: 2018-11-02 | End: 2023-07-25

## 2018-11-02 RX ORDER — HYDROCODONE BITARTRATE AND ACETAMINOPHEN 5; 325 MG/1; MG/1
2 TABLET ORAL ONCE
Status: COMPLETED | OUTPATIENT
Start: 2018-11-02 | End: 2018-11-02

## 2018-11-02 RX ORDER — METHOCARBAMOL 750 MG/1
750 TABLET, FILM COATED ORAL 4 TIMES DAILY PRN
Qty: 15 TABLET | Refills: 0 | Status: SHIPPED | OUTPATIENT
Start: 2018-11-02 | End: 2023-07-25

## 2018-11-02 RX ORDER — KETOROLAC TROMETHAMINE 15 MG/ML
15 INJECTION, SOLUTION INTRAMUSCULAR; INTRAVENOUS ONCE
Status: COMPLETED | OUTPATIENT
Start: 2018-11-02 | End: 2018-11-02

## 2018-11-02 RX ORDER — METHOCARBAMOL 750 MG/1
750 TABLET, FILM COATED ORAL ONCE
Status: COMPLETED | OUTPATIENT
Start: 2018-11-02 | End: 2018-11-02

## 2018-11-02 RX ADMIN — HYDROCODONE BITARTRATE AND ACETAMINOPHEN 2 TABLET: 5; 325 TABLET ORAL at 07:07

## 2018-11-02 RX ADMIN — METHOCARBAMOL 750 MG: 750 TABLET ORAL at 07:07

## 2018-11-02 RX ADMIN — DEXAMETHASONE 10 MG: 2 TABLET ORAL at 07:08

## 2018-11-02 RX ADMIN — KETOROLAC TROMETHAMINE 15 MG: 15 INJECTION, SOLUTION INTRAMUSCULAR; INTRAVENOUS at 07:08

## 2018-11-02 ASSESSMENT — ENCOUNTER SYMPTOMS
NUMBNESS: 0
BACK PAIN: 0
ARTHRALGIAS: 1
MYALGIAS: 1

## 2018-11-02 NOTE — LETTER
November 2, 2018      To Whom It May Concern:      Lorena Conway was seen in our Emergency Department today, 11/02/18.  I expect her condition to improve over the next 1-2 days.  She may return to work when improved.    Sincerely,        Lisa Downey RN

## 2018-11-02 NOTE — ED NOTES
Patient educated on narcotic pain medicine, Norco, as well as medication instructions for Robaxin and steroid dosepak and follow-up with PCP as needed. Educated to not drive or operate equipment while taking this medication. Patient educated that medication can make them drowsy or impaired. Educated that pain medications can cause addiction and that opioids can cause constipation, and to drink plenty of fluids and consume fiber. Patient received discharge instructions and has no other questions at this time.

## 2018-11-02 NOTE — ED TRIAGE NOTES
Right leg pain started 2 weeks ago but improved, on and off, started again when patient woke up at 0430, pain started on top and shooting down to foot. ABCs intact.

## 2018-11-02 NOTE — DISCHARGE INSTRUCTIONS
Possible Causes of Low Back or Leg Pain    The symptoms in your back or leg may be due to pressure on a nerve. This pressure may be caused by a damaged disk or by abnormal bone growth. Either way, you may feel pain, burning, tingling, or numbness. If you have pressure on a nerve that connects to the sciatic nerve, pain may shoot down your leg.    Pressure from the disk  Constant wear and tear can weaken a disk over time and cause back pain. The disk can then be damaged by a sudden movement or injury. If its soft center begins to bulge, the disk may press on a nerve. Or the outside of the disk may tear, and the soft center may squeeze through and pinch a nerve.    Pressure from bone  As a disk wears out, the vertebrae right above and below the disk begin to touch. This can put pressure on a nerve. Often, abnormal bone (called bone spurs) grows where the vertebrae rub against each other. This can cause the foramen or the spinal canal to narrow (called stenosis) and press against a nerve.  Date Last Reviewed: 10/4/2015    4528-2521 [employer]. [address] [place] [address], [place] [address]. All rights reserved. This information is not intended as a substitute for professional medical care. Always follow your healthcare professional's instructions.

## 2018-11-02 NOTE — ED AVS SNAPSHOT
[facility] [place] Emergency Department    [address]    [facility] [address]    Phone:  [phone]    Fax:  [phone]                                       Lorena Conway   MRN: [id number]    Department:  [facility] [place] Emergency Department   Date of Visit:  11/2/2018           After Visit Summary Signature Page     I have received my discharge instructions, and my questions have been answered. I have discussed any challenges I see with this plan with the nurse or doctor.    ..........................................................................................................................................  Patient/Patient Representative Signature      ..........................................................................................................................................  Patient Representative Print Name and Relationship to Patient    ..................................................               ................................................  Date                                   Time    ..........................................................................................................................................  Reviewed by Signature/Title    ...................................................              ..............................................  Date                                               Time          22EPIC Rev 08/18

## 2018-11-02 NOTE — ED AVS SNAPSHOT
[facility] Emergency Department    [address] [place] [address]    Phone:  [phone]    Fax:  [phone]                                       Lorena Conway   MRN: [id number]    Department:  [facility] Emergency Department   Date of Visit:  11/2/2018           Patient Information     Date Of Birth          1980        Your diagnoses for this visit were:     Pain of right lower leg        You were seen by Sin Berrios MD.        Discharge Instructions         Possible Causes of Low Back or Leg Pain    The symptoms in your back or leg may be due to pressure on a nerve. This pressure may be caused by a damaged disk or by abnormal bone growth. Either way, you may feel pain, burning, tingling, or numbness. If you have pressure on a nerve that connects to the sciatic nerve, pain may shoot down your leg.    Pressure from the disk  Constant wear and tear can weaken a disk over time and cause back pain. The disk can then be damaged by a sudden movement or injury. If its soft center begins to bulge, the disk may press on a nerve. Or the outside of the disk may tear, and the soft center may squeeze through and pinch a nerve.    Pressure from bone  As a disk wears out, the vertebrae right above and below the disk begin to touch. This can put pressure on a nerve. Often, abnormal bone (called bone spurs) grows where the vertebrae rub against each other. This can cause the foramen or the spinal canal to narrow (called stenosis) and press against a nerve.  Date Last Reviewed: 10/4/2015    2427-1904 [employer]. [address] [place] [address]. All rights reserved. This information is not intended as a substitute for professional medical care. Always follow your healthcare professional's instructions.          24 Hour Appointment Hotline       To make an appointment at any [facility], call [phone] ([phone]). If you don't have a  family doctor or clinic, we will help you find one. [facility] clinics are conveniently located to serve the needs of you and your family.             Review of your medicines      START taking        Dose / Directions Last dose taken    HYDROcodone-acetaminophen 5-325 MG per tablet   Commonly known as:  NORCO   Dose:  1 tablet   Quantity:  8 tablet        Take 1 tablet by mouth every 4 hours as needed for pain   Refills:  0        methocarbamol 750 MG tablet   Commonly known as:  ROBAXIN   Dose:  750 mg   Quantity:  15 tablet        Take 1 tablet (750 mg) by mouth 4 times daily as needed for muscle spasms   Refills:  0        methylPREDNISolone 4 MG tablet   Commonly known as:  MEDROL DOSEPAK   Quantity:  21 tablet        Follow package instructions   Refills:  0          Our records show that you are taking the medicines listed below. If these are incorrect, please call your family doctor or clinic.        Dose / Directions Last dose taken    naproxen 500 MG tablet   Commonly known as:  NAPROSYN        TAKE 1 TABLET (500 MG) BY MOUTH 2 TIMES DAILY (WITH MEALS)   Refills:  0        oxyCODONE IR 5 MG tablet   Commonly known as:  ROXICODONE   Dose:  5-10 mg   Quantity:  15 tablet        Take 1-2 tablets (5-10 mg) by mouth every 6 hours as needed for pain   Refills:  0                Information about OPIOIDS     PRESCRIPTION OPIOIDS: WHAT YOU NEED TO KNOW   We gave you an opioid (narcotic) pain medicine. It is important to manage your pain, but opioids are not always the best choice. You should first try all the other options your care team gave you. Take this medicine for as short a time (and as few doses) as possible.    Some activities can increase your pain, such as bandage changes or therapy sessions. It may help to take your pain medicine 30 to 60 minutes before these activities. Reduce your stress by getting enough sleep, working on hobbies you enjoy and practicing relaxation or meditation. Talk to your care  team about ways to manage your pain beyond prescription opioids.    These medicines have risks:    DO NOT drive when on new or higher doses of pain medicine. These medicines can affect your alertness and reaction times, and you could be arrested for driving under the influence (DUI). If you need to use opioids long-term, talk to your care team about driving.    DO NOT operate heavy machinery    DO NOT do any other dangerous activities while taking these medicines.    DO NOT drink any alcohol while taking these medicines.     If the opioid prescribed includes acetaminophen, DO NOT take with any other medicines that contain acetaminophen. Read all labels carefully. Look for the word  acetaminophen  or  Tylenol.  Ask your pharmacist if you have questions or are unsure.    You can get addicted to pain medicines, especially if you have a history of addiction (chemical, alcohol or substance dependence). Talk to your care team about ways to reduce this risk.    All opioids tend to cause constipation. Drink plenty of water and eat foods that have a lot of fiber, such as fruits, vegetables, prune juice, apple juice and high-fiber cereal. Take a laxative (Miralax, milk of magnesia, Colace, Senna) if you don t move your bowels at least every other day. Other side effects include upset stomach, sleepiness, dizziness, throwing up, tolerance (needing more of the medicine to have the same effect), physical dependence and slowed breathing.    Store your pills in a secure place, locked if possible. We will not replace any lost or stolen medicine. If you don t finish your medicine, please throw away (dispose) as directed by your pharmacist. The [place] [employer] has more information about safe disposal: https://www.pca.[facility].[place].us/living-green/managing-unwanted-medications        Prescriptions were sent or printed at these locations (3 Prescriptions)                   Other Prescriptions                Printed at  Department/Unit printer (3 of 3)         HYDROcodone-acetaminophen (NORCO) 5-325 MG per tablet               methocarbamol (ROBAXIN) 750 MG tablet               methylPREDNISolone (MEDROL DOSEPAK) 4 MG tablet                Procedures and tests performed during your visit     US Lower Extremity Venous Duplex Right    XR Pelvis w Hip Right 1 View      Orders Needing Specimen Collection     None      Pending Results     No orders found from 10/31/2018 to 11/3/2018.            Pending Culture Results     No orders found from 10/31/2018 to 11/3/2018.            Pending Results Instructions     If you had any lab results that were not finalized at the time of your Discharge, you can call the ED Lab Result RN at [phone]. You will be contacted by this team for any positive Lab results or changes in treatment. The nurses are available 7 days a week from 10A to 6:30P.  You can leave a message 24 hours per day and they will return your call.        Test Results From Your Hospital Stay        11/2/2018  8:45 AM      Narrative     RIGHT LOWER EXTREMITY VENOUS DOPPLER ULTRASOUND  11/2/2018 8:42 AM    HISTORY: Right lower extremity pain. The concern is for deep venous  thrombosis.    COMPARISON: An ultrasound on 4/12/2017.    FINDINGS: Color flow and Doppler spectral waveform analysis  demonstrates normal blood flow in the common femoral, femoral,  popliteal, posterior tibial, and greater saphenous veins of the right  lower extremity. No thrombus is seen.        Impression     IMPRESSION: There is no evidence of deep venous thrombosis in the  right lower extremity.    JENNIFFER HANSON MD         11/2/2018  8:59 AM      Narrative     PELVIS AND RIGHT HIP ONE VIEW  11/2/2018 8:55 AM    HISTORY:  Pain.    COMPARISON:  None.    FINDINGS:  No fracture or osseous lesion is seen. The joint spaces are  well preserved. No adjacent soft tissue pathology is seen.        Impression     IMPRESSION:  Unremarkable  examination.    JENNIFFER HANSON MD                Clinical Quality Measure: Blood Pressure Screening     Your blood pressure was checked while you were in the emergency department today. The last reading we obtained was  BP: 110/61 . Please read the guidelines below about what these numbers mean and what you should do about them.  If your systolic blood pressure (the top number) is less than 120 and your diastolic blood pressure (the bottom number) is less than 80, then your blood pressure is normal. There is nothing more that you need to do about it.  If your systolic blood pressure (the top number) is 120-139 or your diastolic blood pressure (the bottom number) is 80-89, your blood pressure may be higher than it should be. You should have your blood pressure rechecked within a year by a primary care provider.  If your systolic blood pressure (the top number) is 140 or greater or your diastolic blood pressure (the bottom number) is 90 or greater, you may have high blood pressure. High blood pressure is treatable, but if left untreated over time it can put you at risk for heart attack, stroke, or kidney failure. You should have your blood pressure rechecked by a primary care provider within the next 4 weeks.  If your provider in the emergency department today gave you specific instructions to follow-up with your doctor or provider even sooner than that, you should follow that instruction and not wait for up to 4 weeks for your follow-up visit.        Thank you for choosing [facility]       Thank you for choosing [facility] for your care. Our goal is always to provide you with excellent care. Hearing back from our patients is one way we can continue to improve our services. Please take a few minutes to complete the written survey that you may receive in the mail after you visit with us. Thank you!        [facility] Information     [employer] lets you send messages to your doctor, view your test results, renew your  "prescriptions, schedule appointments and more. To sign up, go to [email or web address] . Click on \"Log in\" on the left side of the screen, which will take you to the Welcome page. Then click on \"Sign up Now\" on the right side of the page.     You will be asked to enter the access code listed below, as well as some personal information. Please follow the directions to create your username and password.     Your access code is: [email or web address]  Expires: 2019  9:57 AM     Your access code will  in 90 days. If you need help or a new code, please call your [place] clinic or [phone].        Care EveryWhere ID     This is your Care EveryWhere ID. This could be used by other organizations to access your [place] medical records  [id number]        Equal Access to Services     TANVIR PANDYA : Marlyn Matta, emma anders, chetna alba, ajit smith . So [facility] [phone].    ATENCIÓN: Si habla español, tiene a diaz disposición servicios gratuitos de asistencia lingüística. Llame al [phone].    We comply with applicable federal civil rights laws and [place] laws. We do not discriminate on the basis of race, color, national origin, age, disability, sex, sexual orientation, or gender identity.            After Visit Summary       This is your record. Keep this with you and show to your community pharmacist(s) and doctor(s) at your next visit.                  "

## 2018-11-02 NOTE — ED NOTES
"Pt ambulated approximately 50 yards. Pt Refused to put full weight on right leg. Pt complained of pain and \"tightness\" extending down into the right calf. Pt unable to lay on right side due to pain.  "

## 2018-11-02 NOTE — ED PROVIDER NOTES
"  History     Chief Complaint:  No chief complaint on file.    HPI   Lorena Conway is a 38 year old female with history of plantar fasciitis, who presents with right sided leg pain. Of note, the patient reports that she has had a previous lower back injury requiring two injections and her pain went away. The patient reports that she had to run for her job recently and re-injured her back, resulting in intermittent shooting pain down her lateral right leg for the past 1.5 weeks. Her pain is made worse with movement of the right lower extremity and is better at rest. This morning the patient woke up around 04:30 this morning due to her pain and subsequently presented to the ED. No numbness or tingling.     Allergies:  No Known Drug Allergies      Medications:    Roxicodone      Past Medical History:    Asymptomatic varicose veins - right   Plantar fasciitis     Past Surgical History:    History reviewed. No pertinent past surgical history.     Family History:    History reviewed. No pertinent family history.      Social History:  The patient was accompanied to the ED by .  Smoking Status: never  Smokeless Tobacco: never  Alcohol Use: no    Marital Status:   [2]     Review of Systems   Musculoskeletal: Positive for arthralgias (right lower extremity ) and myalgias. Negative for back pain.   Neurological: Negative for numbness.   All other systems reviewed and are negative.    Physical Exam     Patient Vitals for the past 24 hrs:   BP Temp Temp src Pulse Heart Rate Resp SpO2 Height Weight   11/02/18 0800 104/65 - - - - - 98 % - -   11/02/18 0745 109/67 - - - - - - - -   11/02/18 0730 109/78 - - - - - 99 % - -   11/02/18 0715 - - - - - - 99 % - -   11/02/18 0615 133/89 98  F (36.7  C) Oral 69 69 18 99 % 1.6 m (5' 3\") 81.6 kg (180 lb)      Physical Exam  Vital signs and nursing notes reviewed.     Constitutional:  Distressed due to pain  HENT: Oropharynx is clear and moist  Eyes: Conjunctivae are normal " bilaterally. Pupils equal  Neck: normal range of motion  Cardiovascular: Normal rate, regular rhythm, normal heart sounds.  Pulmonary/Chest: Effort normal and breath sounds normal. No respiratory distress.   Abdominal: Soft. Bowel sounds are normal. No tenderness to palpation. No rebound or guarding.   Musculoskeletal: No midline back tenderness. Reproducible pain along the right sacroilliac and buttock and lateral hip to palpation. No overlying erythema or swelling.  Neurological: Alert and oriented x3. Normal patellar and achilles deep tendon reflexes bilaterally. Normal strength with: extension of big toes, dorsiflexion/plantarflexion at the ankles, flexion/extension at knees, and hip flexion bilaterally without significant unilateral weakness. No sensory deficits.  Skin: Skin is warm and dry. No rash noted.   Psych: Normal affect    Emergency Department Course     Imaging:  Radiology findings were communicated with the patient who voiced understanding of the findings.    XR Pelvis w Hip Right 1 View  Final Result  Unremarkable examination.  JENNIFFER HANSON MD    US Lower Extremity Venous Duplex Right  Final Result  There is no evidence of deep venous thrombosis in the  right lower extremity.  JENNIFFER HANSON MD    Interventions:  0707 Norco 5-325 mg, 2 tablets PO  0707 robaxin 750 mg PO  0708 Decadron, 10 mg, PO  0708 Toradol 15 mg IM     Emergency Department Course:  Nursing notes and vitals reviewed.  I entered the room.  I performed an exam of the patient as documented above.     IV was inserted and blood was drawn for laboratory testing, results above.    The patient provided a urine sample here in the emergency department. This was sent for laboratory testing, findings above.     The patient was sent for Ultrasound and X-ray while in the emergency department, results above.     The patient received the above intervention(s).     0915 the patient was rechecked and updated regarding the results of the  imaging studies.      I discussed the treatment plan with the patient. They expressed understanding of this plan and consented to discharge. They will be discharged home with instructions for care and follow up. In addition, the patient will return to the emergency department if their symptoms worsen, if new symptoms arise or if there is any concern.  All questions were answered.     Impression & Plan      Medical Decision Making:  Lorena Conway is a pleasant 38 year old female who presents with a pain in her right lower back/hip area and lateral lower leg. On examination, she did have reproducible pain in her right sacroiliac and right trochanteric, but also in the sciatic nerve area in her buttock. There is no overlying, erythema, swelling, or induration. X-rays in the right lower extremity and ultrasound were obtained and are thankfully negative. The patient has required what sounds like nerve root injections in the past and I suspect she likely has a radiculopathy. Again, I cannot exclude sciatica or trochanteric bursitis process, but she seemed to also have more pain in her right lower calf to suggest more lumbar radicular process. There is no indication she has cauda equina or needs emergent MRI. The patient was given decadron medication here for discomfort. I recommended continued medrol Dosepak at home and she is also to walk, sit, and stand, and change positions frequently. I advised her on stretching exercises, as well as icing. I recommended she follow up with her primary care provider or [facility] spine clinic on Monday if no significant improvement or return here if she develops any bowel or bladder changes, or bilateral leg pain, weakness, or other concern. There is no indication of intraabdominal pathology or vascular process. I felt she could be safely discharged to home and the patient understands and agrees with this plan.    Diagnosis:    ICD-10-CM    1. Pain of right lower leg M79.661      Disposition:    The patient was discharged to home.    Discharge Medications:  New Prescriptions    HYDROCODONE-ACETAMINOPHEN (NORCO) 5-325 MG PER TABLET    Take 1 tablet by mouth every 4 hours as needed for pain    METHOCARBAMOL (ROBAXIN) 750 MG TABLET    Take 1 tablet (750 mg) by mouth 4 times daily as needed for muscle spasms    METHYLPREDNISOLONE (MEDROL DOSEPAK) 4 MG TABLET    Follow package instructions     Scribe Disclosure:  Kun SCHUMACHER, am serving as a scribe at 6:45 AM on 11/2/2018 to document services personally performed by Sin Berrios MD, based on my observations and the provider's statements to me.   [facility] EMERGENCY DEPARTMENT       Sin Berrios MD  11/02/18 2736

## 2018-11-02 NOTE — LETTER
November 2, 2018      To Whom It May Concern:      Lorena Conway was seen in our Emergency Department today, 11/02/18.  I expect her condition to improve over the next 5 days.  She may return to work when improved.    Sincerely,        Sin Berrios MD

## 2023-07-02 ENCOUNTER — HEALTH MAINTENANCE LETTER (OUTPATIENT)
Age: 43
End: 2023-07-02

## 2023-07-14 ENCOUNTER — MYC MEDICAL ADVICE (OUTPATIENT)
Dept: FAMILY MEDICINE | Facility: CLINIC | Age: 43
End: 2023-07-14
Payer: COMMERCIAL

## 2023-07-25 ENCOUNTER — OFFICE VISIT (OUTPATIENT)
Dept: FAMILY MEDICINE | Facility: CLINIC | Age: 43
End: 2023-07-25
Payer: COMMERCIAL

## 2023-07-25 VITALS
BODY MASS INDEX: 30.03 KG/M2 | TEMPERATURE: 98.2 F | OXYGEN SATURATION: 98 % | HEART RATE: 72 BPM | DIASTOLIC BLOOD PRESSURE: 80 MMHG | HEIGHT: 63 IN | SYSTOLIC BLOOD PRESSURE: 110 MMHG | WEIGHT: 169.5 LBS | RESPIRATION RATE: 20 BRPM

## 2023-07-25 DIAGNOSIS — Z12.4 CERVICAL CANCER SCREENING: ICD-10-CM

## 2023-07-25 DIAGNOSIS — Z00.00 ROUTINE GENERAL MEDICAL EXAMINATION AT A HEALTH CARE FACILITY: Primary | ICD-10-CM

## 2023-07-25 DIAGNOSIS — E66.9 NON MORBID OBESITY: ICD-10-CM

## 2023-07-25 PROCEDURE — 99203 OFFICE O/P NEW LOW 30 MIN: CPT | Mod: 25 | Performed by: PHYSICIAN ASSISTANT

## 2023-07-25 PROCEDURE — G0145 SCR C/V CYTO,THINLAYER,RESCR: HCPCS | Performed by: PHYSICIAN ASSISTANT

## 2023-07-25 PROCEDURE — 87624 HPV HI-RISK TYP POOLED RSLT: CPT | Performed by: PHYSICIAN ASSISTANT

## 2023-07-25 PROCEDURE — 99386 PREV VISIT NEW AGE 40-64: CPT | Performed by: PHYSICIAN ASSISTANT

## 2023-07-25 RX ORDER — PHENTERMINE HYDROCHLORIDE 15 MG/1
15 CAPSULE ORAL EVERY MORNING
Qty: 30 CAPSULE | Refills: 0 | Status: SHIPPED | OUTPATIENT
Start: 2023-07-25 | End: 2023-09-06 | Stop reason: DRUGHIGH

## 2023-07-25 ASSESSMENT — ENCOUNTER SYMPTOMS
BREAST MASS: 0
NAUSEA: 0
JOINT SWELLING: 0
ABDOMINAL PAIN: 0
CHILLS: 0
CONSTIPATION: 0
HEARTBURN: 0
SORE THROAT: 0
EYE PAIN: 0
DYSURIA: 0
COUGH: 0
MYALGIAS: 0
FEVER: 0
FREQUENCY: 0
PALPITATIONS: 0
NERVOUS/ANXIOUS: 0
DIZZINESS: 0
SHORTNESS OF BREATH: 0
PARESTHESIAS: 0
HEADACHES: 0
ARTHRALGIAS: 1
HEMATOCHEZIA: 0
WEAKNESS: 0
HEMATURIA: 0
DIARRHEA: 0

## 2023-07-25 ASSESSMENT — PAIN SCALES - GENERAL: PAINLEVEL: NO PAIN (0)

## 2023-07-25 NOTE — NURSING NOTE
"Chief Complaint   Patient presents with    Physical     And Pap     Initial /80 (BP Location: Right arm, Patient Position: Sitting, Cuff Size: Adult Regular)   Pulse 72   Temp 98.2  F (36.8  C) (Oral)   Resp 20   Ht 1.594 m (5' 2.75\")   Wt 76.9 kg (169 lb 8 oz)   SpO2 98%   BMI 30.27 kg/m   Estimated body mass index is 30.27 kg/m  as calculated from the following:    Height as of this encounter: 1.594 m (5' 2.75\").    Weight as of this encounter: 76.9 kg (169 lb 8 oz).  BP completed using cuff size regular long right arm    Lisa Magill, CMA    "

## 2023-07-25 NOTE — PROGRESS NOTES
SUBJECTIVE:   CC: Lorena is an 43 year old who presents for preventive health visit.       2023    10:41 AM   Additional Questions   Roomed by Lisa Magill, CMA   Accompanied by daughter         2023    10:41 AM   Patient Reported Additional Medications   Patient reports taking the following new medications NONE     Healthy Habits:     Getting at least 3 servings of Calcium per day:  Yes    Bi-annual eye exam:  Yes    Dental care twice a year:  Yes    Sleep apnea or symptoms of sleep apnea:  None    Diet:  Low fat/cholesterol and Breakfast skipped    Frequency of exercise:  4-5 days/week    Duration of exercise:  45-60 minutes    Taking medications regularly:  Yes    Medication side effects:  None    Additional concerns today:  No   Weight concerns  Exercises regularly at gym  Continuing to put on weight  Diet is fairly good.    Today's PHQ-2 Score:       2023    10:42 AM   PHQ-2 (  [employer])   Q1: Little interest or pleasure in doing things 0   Q2: Feeling down, depressed or hopeless 2   PHQ-2 Score 2   Q1: Little interest or pleasure in doing things Not at all   Q2: Feeling down, depressed or hopeless More than half the days   PHQ-2 Score 2       Have you ever done Advance Care Planning? (For example, a Health Directive, POLST, or a discussion with a medical provider or your loved ones about your wishes): No, advance care planning information given to patient to review.  Patient declined advance care planning discussion at this time.    Social History     Tobacco Use    Smoking status: Never    Smokeless tobacco: Never   Substance Use Topics    Alcohol use: No             2023    10:41 AM   Alcohol Use   Prescreen: >3 drinks/day or >7 drinks/week? No          No data to display              Reviewed orders with patient.  Reviewed health maintenance and updated orders accordingly - Yes  Lab work is in process    Breast Cancer Screenin/25/2023    10:42 AM   Breast CA Risk Assessment  "(FHS-7)   Do you have a family history of breast, colon, or ovarian cancer? No / Unknown         Mammogram Screening - Offered annual screening and updated Health Maintenance for [place] plan based on risk factor consideration  Pertinent mammograms are reviewed under the imaging tab.    History of abnormal Pap smear: NO - age 30-65 PAP every 5 years with negative HPV co-testing recommended     Reviewed and updated as needed this visit by clinical staff   Tobacco  Allergies  Meds  Problems  Med Hx  Surg Hx  Fam Hx          Reviewed and updated as needed this visit by Provider                     Review of Systems   Constitutional:  Negative for chills and fever.   HENT:  Negative for congestion, ear pain, hearing loss and sore throat.    Eyes:  Negative for pain and visual disturbance.   Respiratory:  Negative for cough and shortness of breath.    Cardiovascular:  Negative for chest pain, palpitations and peripheral edema.   Gastrointestinal:  Negative for abdominal pain, constipation, diarrhea, heartburn, hematochezia and nausea.   Breasts:  Negative for tenderness, breast mass and discharge.   Genitourinary:  Negative for dysuria, frequency, genital sores, hematuria, pelvic pain, urgency, vaginal bleeding and vaginal discharge.   Musculoskeletal:  Positive for arthralgias. Negative for joint swelling and myalgias.   Skin:  Negative for rash.   Neurological:  Negative for dizziness, weakness, headaches and paresthesias.   Psychiatric/Behavioral:  Negative for mood changes. The patient is not nervous/anxious.           OBJECTIVE:   /80 (BP Location: Right arm, Patient Position: Sitting, Cuff Size: Adult Regular)   Pulse 72   Temp 98.2  F (36.8  C) (Oral)   Resp 20   Ht 1.594 m (5' 2.75\")   Wt 76.9 kg (169 lb 8 oz)   SpO2 98%   BMI 30.27 kg/m    Physical Exam  GENERAL: healthy, alert and no distress  EYES: Eyes grossly normal to inspection, PERRL and conjunctivae and sclerae normal  HENT: ear canals " and TM's normal, nose and mouth without ulcers or lesions  NECK: no adenopathy, no asymmetry, masses, or scars and thyroid normal to palpation  RESP: lungs clear to auscultation - no rales, rhonchi or wheezes  BREAST: normal without masses, tenderness or nipple discharge and no palpable axillary masses or adenopathy  CV: regular rate and rhythm, normal S1 S2, no S3 or S4, no murmur, click or rub, no peripheral edema and peripheral pulses strong  ABDOMEN: soft, nontender, no hepatosplenomegaly, no masses and bowel sounds normal   (female): normal female external genitalia, normal urethral meatus, vaginal mucosa pink, moist, well rugated, and normal cervix/adnexa/uterus without masses or discharge  MS: no gross musculoskeletal defects noted, no edema  SKIN: no suspicious lesions or rashes  NEURO: Normal strength and tone, mentation intact and speech normal  PSYCH: mentation appears normal, affect normal/bright    Diagnostic Test Results:  Labs reviewed in Epic    ASSESSMENT/PLAN:   1. Routine general medical examination at a health care facility  Will check labs today- patient is fasting.  - Lipid panel reflex to direct LDL Fasting; Future  - Hemoglobin A1c; Future  - Basic metabolic panel  (Ca, Cl, CO2, Creat, Gluc, K, Na, BUN); Future  - TSH with free T4 reflex; Future  - Insulin level; Future    2. Cervical cancer screening    - PAP screen with HPV - recommended age 30 - 65 years  - HPV Hold (Lab Only)  - HPV High Risk Types DNA Cervical    3. Non morbid obesity  Patient interested in trial of medication today.  Will continue with diet and exercise efforts.  Call for refill when needed- may need BP check  Discussed mechanism of action of medication, proper dosing, potential side effects and appropriate follow up.    - Hemoglobin A1c; Future  - TSH with free T4 reflex; Future  - Insulin level; Future  - phentermine (ADIPEX-P) 15 MG capsule; Take 1 capsule (15 mg) by mouth every morning  Dispense: 30 capsule;  "Refill: 0            COUNSELING:  Reviewed preventive health counseling, as reflected in patient instructions       Regular exercise       Healthy diet/nutrition      BMI:   Estimated body mass index is 30.27 kg/m  as calculated from the following:    Height as of this encounter: 1.594 m (5' 2.75\").    Weight as of this encounter: 76.9 kg (169 lb 8 oz).   Weight management plan: Discussed healthy diet and exercise guidelines      She reports that she has never smoked. She has never used smokeless tobacco.          Trey Richardson PA-C  [facility]  "

## 2023-07-25 NOTE — PATIENT INSTRUCTIONS
Preventive Health Recommendations  Female Ages 40 to 49    Yearly exam:   See your health care provider every year in order to  Review health changes.   Discuss preventive care.    Review your medicines if your doctor prescribed any.    Get a Pap test every three years (unless you have an abnormal result and your provider advises testing more often).    If you get Pap tests with HPV test, you only need to test every 5 years, unless you have an abnormal result. You do not need a Pap test if your uterus was removed (hysterectomy) and you have not had cancer.    You should be tested each year for STDs (sexually transmitted diseases), if you're at risk.   Ask your doctor if you should have a mammogram.    Have a colonoscopy (test for colon cancer) if someone in your family has had colon cancer or polyps before age 50.     Have a cholesterol test every 5 years.     Have a diabetes test (fasting glucose) after age 45. If you are at risk for diabetes, you should have this test every 3 years.    Shots: Get a flu shot each year. Get a tetanus shot every 10 years.     Nutrition:   Eat at least 5 servings of fruits and vegetables each day.  Eat whole-grain bread, whole-wheat pasta and brown rice instead of white grains and rice.  Get adequate Calcium and Vitamin D.      Lifestyle  Exercise at least 150 minutes a week (an average of 30 minutes a day, 5 days a week). This will help you control your weight and prevent disease.  Limit alcohol to one drink per day.  No smoking.   Wear sunscreen to prevent skin cancer.  See your dentist every six months for an exam and cleaning.

## 2023-07-28 LAB
BKR LAB AP GYN ADEQUACY: NORMAL
BKR LAB AP GYN INTERPRETATION: NORMAL
BKR LAB AP HPV REFLEX: NORMAL
BKR LAB AP PREVIOUS ABNORMAL: NORMAL
PATH REPORT.COMMENTS IMP SPEC: NORMAL
PATH REPORT.COMMENTS IMP SPEC: NORMAL
PATH REPORT.RELEVANT HX SPEC: NORMAL

## 2023-08-01 LAB
HUMAN PAPILLOMA VIRUS 16 DNA: NEGATIVE
HUMAN PAPILLOMA VIRUS 18 DNA: NEGATIVE
HUMAN PAPILLOMA VIRUS FINAL DIAGNOSIS: NORMAL
HUMAN PAPILLOMA VIRUS OTHER HR: NEGATIVE

## 2023-08-11 ENCOUNTER — TELEPHONE (OUTPATIENT)
Dept: FAMILY MEDICINE | Facility: CLINIC | Age: 43
End: 2023-08-11
Payer: COMMERCIAL

## 2023-08-11 ENCOUNTER — MYC REFILL (OUTPATIENT)
Dept: FAMILY MEDICINE | Facility: CLINIC | Age: 43
End: 2023-08-11
Payer: COMMERCIAL

## 2023-08-11 DIAGNOSIS — E66.9 NON MORBID OBESITY: ICD-10-CM

## 2023-08-11 DIAGNOSIS — E66.9 NON MORBID OBESITY: Primary | ICD-10-CM

## 2023-08-11 RX ORDER — PHENTERMINE HYDROCHLORIDE 37.5 MG/1
37.5 TABLET ORAL
Qty: 30 TABLET | Refills: 0 | Status: CANCELLED | OUTPATIENT
Start: 2023-08-11

## 2023-08-11 RX ORDER — PHENTERMINE HYDROCHLORIDE 37.5 MG/1
37.5 TABLET ORAL
Qty: 30 TABLET | Refills: 0 | Status: SHIPPED | OUTPATIENT
Start: 2023-08-11 | End: 2023-08-27

## 2023-08-11 NOTE — TELEPHONE ENCOUNTER
FYI - Status Update    Who is Calling: patient    Update: Pt called this morning and wanted to update provider that pt would like to up           phentermine (ADIPEX-P) 15 MG capsule    To 30 mg. Please call pt back with any questions.    Does caller want a call/response back: Yes     Could we send this information to you in [employer] or would you prefer to receive a phone call?:   Patient would prefer a phone call   Okay to leave a detailed message?: Yes at Cell number on file:    Telephone Information:   Mobile [phone]

## 2023-08-11 NOTE — TELEPHONE ENCOUNTER
Called patient, left voicemail-Ok to leave detailed message per note below. Advised on Trey Richardson's recommendations below.    Marisol Durán RN on 8/11/2023 at 11:14 AM

## 2023-08-11 NOTE — TELEPHONE ENCOUNTER
No problem.  If she has any left she can take TWO.  Next dose up is 37.5mg.  I can send that in now.      Trey

## 2023-08-27 ENCOUNTER — MYC REFILL (OUTPATIENT)
Dept: FAMILY MEDICINE | Facility: CLINIC | Age: 43
End: 2023-08-27
Payer: COMMERCIAL

## 2023-08-27 DIAGNOSIS — E66.9 NON MORBID OBESITY: ICD-10-CM

## 2023-08-27 NOTE — LETTER
September 5, 2023      Lorena Conway  [address]  [facility] [address]        Holden Carrillo,     We recently received a call from your pharmacy requesting a refill of your medication Requested Refill           phentermine (ADIPEX-P) 37.5 MG tablet   . We are contacting you today to notify you that you are due for a follow up for further refills.     We have authorized a one time refill of your medication to allow time for you to schedule an appointment. This appointment can be in clinic or virtual, by telephone or video.     Please call [phone] to schedule an appointment or if you have MyChart you can schedule with your provider as well.     Taking care of your health is important to us, and ongoing visits with your provider are vital to your care. We look forward to seeing you in the near future.     Thank you for using [facility] for your medical needs.           Sincerely,        Trey Richardson PA-C

## 2023-08-28 RX ORDER — PHENTERMINE HYDROCHLORIDE 37.5 MG/1
37.5 TABLET ORAL
Qty: 30 TABLET | Refills: 0 | OUTPATIENT
Start: 2023-08-28

## 2023-08-28 NOTE — TELEPHONE ENCOUNTER
Sent [employer] message requesting a call back for an appt. Two more attempts will be made.     Katerina Roberts

## 2023-08-28 NOTE — TELEPHONE ENCOUNTER
Higher dose started on 8/11 and Rx was sent.  Too early for refill and with dose just changing she should have follow-up with PCP.  Could be virtual.     Claudia Rodríguez CNP

## 2023-08-29 NOTE — TELEPHONE ENCOUNTER
Routing refill request to provider for review/approval because:  Drug not on the [facility] refill protocol   Tonia Rodriguez RN

## 2023-08-30 RX ORDER — PHENTERMINE HYDROCHLORIDE 37.5 MG/1
37.5 TABLET ORAL
Qty: 30 TABLET | Refills: 0 | Status: SHIPPED | OUTPATIENT
Start: 2023-08-30 | End: 2023-09-06

## 2023-08-30 NOTE — TELEPHONE ENCOUNTER
New med and higher dose started.  Refilled, but she does need follow-up with her PCP.  Assist with scheduling.    Claudia Rodríguez CNP

## 2023-09-06 ENCOUNTER — VIRTUAL VISIT (OUTPATIENT)
Dept: FAMILY MEDICINE | Facility: CLINIC | Age: 43
End: 2023-09-06
Payer: COMMERCIAL

## 2023-09-06 DIAGNOSIS — E66.9 NON MORBID OBESITY: ICD-10-CM

## 2023-09-06 PROCEDURE — 99441 PR PHYSICIAN TELEPHONE EVALUATION 5-10 MIN: CPT | Mod: 95 | Performed by: PHYSICIAN ASSISTANT

## 2023-09-06 RX ORDER — PHENTERMINE HYDROCHLORIDE 37.5 MG/1
37.5 TABLET ORAL
Qty: 90 TABLET | Refills: 0 | Status: SHIPPED | OUTPATIENT
Start: 2023-09-06 | End: 2023-10-07

## 2023-09-06 NOTE — PROGRESS NOTES
Lorena is a 43 year old who is being evaluated via a billable telephone visit.      What phone number would you like to be contacted at? [phone]   How would you like to obtain your AVS? Uzma    Distant Location (provider location):  Off-site    Assessment & Plan     Non morbid obesity  Doing well- patient is feeling better and tolerating this well.  Follow up 6 months.  Refills ok until then.  She can get her weight and BP checked next week.  - phentermine (ADIPEX-P) 37.5 MG tablet; Take 1 tablet (37.5 mg) by mouth every morning (before breakfast)        SEGUN Hollis [facility] [place]    Subjective   Lorena is a 43 year old, presenting for the following health issues:  Recheck Medication (Weight management )      History of Present Illness       Reason for visit:  To refill my prescription    She eats 4 or more servings of fruits and vegetables daily.She consumes 0 sweetened beverage(s) daily.She exercises with enough effort to increase her heart rate 60 or more minutes per day.  She exercises with enough effort to increase her heart rate 5 days per week.   She is taking medications regularly.     PT STATES HAS AN APPOINTMENT NEXT WEEK TO BRING IN HER CHILDREN AND REMIND US TO GET A WEIGHT ON HER THEN.     Started at 15mg  Feeling more appetite suppression at 37.5mg  Clothes are fitting better- doesn't like weighing herself    Review of Systems   Constitutional, HEENT, cardiovascular, pulmonary, gi and gu systems are negative, except as otherwise noted.      Objective    Vitals - Patient Reported  Pain Score: No Pain (0)        Physical Exam   healthy, alert, and no distress  PSYCH: Alert and oriented times 3; coherent speech, normal   rate and volume, able to articulate logical thoughts, able   to abstract reason, no tangential thoughts, no hallucinations   or delusions  Her affect is normal and pleasant  RESP: No cough, no audible wheezing, able to talk in full  sentences  Remainder of exam unable to be completed due to telephone visits              Phone call duration: 7 minutes

## 2023-09-08 ENCOUNTER — MYC REFILL (OUTPATIENT)
Dept: FAMILY MEDICINE | Facility: CLINIC | Age: 43
End: 2023-09-08
Payer: COMMERCIAL

## 2023-09-08 DIAGNOSIS — E66.9 NON MORBID OBESITY: ICD-10-CM

## 2023-09-08 RX ORDER — PHENTERMINE HYDROCHLORIDE 37.5 MG/1
37.5 TABLET ORAL
Qty: 90 TABLET | Refills: 0 | OUTPATIENT
Start: 2023-09-08

## 2023-09-12 ENCOUNTER — TELEPHONE (OUTPATIENT)
Dept: FAMILY MEDICINE | Facility: CLINIC | Age: 43
End: 2023-09-12

## 2023-09-12 ENCOUNTER — ALLIED HEALTH/NURSE VISIT (OUTPATIENT)
Dept: FAMILY MEDICINE | Facility: CLINIC | Age: 43
End: 2023-09-12
Payer: COMMERCIAL

## 2023-09-12 VITALS
HEART RATE: 73 BPM | WEIGHT: 159.9 LBS | DIASTOLIC BLOOD PRESSURE: 80 MMHG | SYSTOLIC BLOOD PRESSURE: 130 MMHG | BODY MASS INDEX: 28.55 KG/M2

## 2023-09-12 DIAGNOSIS — I10 HTN (HYPERTENSION): Primary | ICD-10-CM

## 2023-09-12 PROCEDURE — 99207 PR NO CHARGE NURSE ONLY: CPT

## 2023-09-12 NOTE — PROGRESS NOTES
Patient came in today for a blood pressure check. 130/80 p 73. Patient stated she is having some moving stress at home for the reason why it was so high today.     Eduar RIOJAS

## 2023-09-19 ENCOUNTER — MYC REFILL (OUTPATIENT)
Dept: FAMILY MEDICINE | Facility: CLINIC | Age: 43
End: 2023-09-19
Payer: COMMERCIAL

## 2023-09-19 DIAGNOSIS — E66.9 NON MORBID OBESITY: ICD-10-CM

## 2023-09-19 RX ORDER — PHENTERMINE HYDROCHLORIDE 37.5 MG/1
37.5 TABLET ORAL
Qty: 90 TABLET | Refills: 0 | Status: CANCELLED | OUTPATIENT
Start: 2023-09-19

## 2023-09-26 NOTE — TELEPHONE ENCOUNTER
Patient calling to request refill for phentermine. Patient states she only received 30 pills and was supposed to get 90 which is what it was written for on 9/6/23. Advised patient to call [facility] pharmacy and speak with a technician or pharmacist. Patient verbalized understanding, no further questions.    Marisol Durán RN on 9/26/2023 at 1:28 PM

## 2023-10-07 ENCOUNTER — MYC REFILL (OUTPATIENT)
Dept: FAMILY MEDICINE | Facility: CLINIC | Age: 43
End: 2023-10-07
Payer: COMMERCIAL

## 2023-10-07 DIAGNOSIS — E66.9 NON MORBID OBESITY: ICD-10-CM

## 2023-10-08 ENCOUNTER — MYC REFILL (OUTPATIENT)
Dept: FAMILY MEDICINE | Facility: CLINIC | Age: 43
End: 2023-10-08
Payer: COMMERCIAL

## 2023-10-08 DIAGNOSIS — E66.9 NON MORBID OBESITY: ICD-10-CM

## 2023-10-08 RX ORDER — PHENTERMINE HYDROCHLORIDE 37.5 MG/1
37.5 TABLET ORAL
Qty: 90 TABLET | Refills: 0 | Status: CANCELLED | OUTPATIENT
Start: 2023-10-08

## 2023-10-09 ENCOUNTER — MYC MEDICAL ADVICE (OUTPATIENT)
Dept: FAMILY MEDICINE | Facility: CLINIC | Age: 43
End: 2023-10-09
Payer: COMMERCIAL

## 2023-10-09 RX ORDER — PHENTERMINE HYDROCHLORIDE 37.5 MG/1
37.5 TABLET ORAL
Qty: 30 TABLET | Refills: 1 | Status: SHIPPED | OUTPATIENT
Start: 2023-10-09 | End: 2023-11-07

## 2023-10-09 NOTE — TELEPHONE ENCOUNTER
I sent in #90 last time but I see she only got #30.  That is likely due to her insurance.  So this time I just sent in #30.    Trey

## 2023-11-07 ENCOUNTER — MYC REFILL (OUTPATIENT)
Dept: FAMILY MEDICINE | Facility: CLINIC | Age: 43
End: 2023-11-07
Payer: COMMERCIAL

## 2023-11-07 DIAGNOSIS — E66.9 NON MORBID OBESITY: ICD-10-CM

## 2023-11-08 RX ORDER — PHENTERMINE HYDROCHLORIDE 37.5 MG/1
37.5 TABLET ORAL
Qty: 30 TABLET | Refills: 1 | Status: SHIPPED | OUTPATIENT
Start: 2023-11-08 | End: 2024-01-11

## 2023-12-07 ENCOUNTER — MYC REFILL (OUTPATIENT)
Dept: FAMILY MEDICINE | Facility: CLINIC | Age: 43
End: 2023-12-07
Payer: COMMERCIAL

## 2023-12-07 DIAGNOSIS — E66.9 NON MORBID OBESITY: ICD-10-CM

## 2023-12-08 RX ORDER — PHENTERMINE HYDROCHLORIDE 37.5 MG/1
37.5 TABLET ORAL
Qty: 30 TABLET | Refills: 1 | OUTPATIENT
Start: 2023-12-08

## 2023-12-11 ENCOUNTER — MYC REFILL (OUTPATIENT)
Dept: FAMILY MEDICINE | Facility: CLINIC | Age: 43
End: 2023-12-11
Payer: COMMERCIAL

## 2023-12-11 ENCOUNTER — TELEPHONE (OUTPATIENT)
Dept: FAMILY MEDICINE | Facility: CLINIC | Age: 43
End: 2023-12-11
Payer: COMMERCIAL

## 2023-12-11 DIAGNOSIS — E66.9 NON MORBID OBESITY: ICD-10-CM

## 2023-12-11 RX ORDER — PHENTERMINE HYDROCHLORIDE 37.5 MG/1
37.5 TABLET ORAL
Qty: 30 TABLET | Refills: 1 | OUTPATIENT
Start: 2023-12-11

## 2023-12-11 NOTE — TELEPHONE ENCOUNTER
Medication Question or Refill    Contacts         Type Contact Phone/Fax    12/11/2023 12:21 PM CST Phone (Incoming) Lorena Conway (Self) [phone] (H)            What medication are you calling about (include dose and sig)?: Phentermine 37.5mg    Preferred Pharmacy:   [facility]/pharmacy #[id number] - [place], [place] - [address]   [address] [facility] [address]  Phone: [phone] Fax: [phone]    [facility] [address] [employer] - [place], [place] - [address] [facility]  [address] [facility] [address]  Phone: [phone] Fax: [phone]      Controlled Substance Agreement on file:   CSA -- Patient Level:    CSA: None found at the patient level.       Who prescribed the medication?:     Do you need a refill? Yes    When did you use the medication last? 12/11/2023    Patient offered an appointment? No    Do you have any questions or concerns?  No      Could we send this information to you in [facility] or would you prefer to receive a phone call?:   Patient would like to be contacted via [facility]

## 2024-01-01 ENCOUNTER — HOSPITAL ENCOUNTER (EMERGENCY)
Facility: CLINIC | Age: 44
Discharge: HOME | End: 2024-01-01
Attending: PHYSICIAN ASSISTANT | Admitting: PHYSICIAN ASSISTANT
Payer: OTHER MISCELLANEOUS

## 2024-01-01 ENCOUNTER — APPOINTMENT (OUTPATIENT)
Dept: GENERAL RADIOLOGY | Facility: CLINIC | Age: 44
End: 2024-01-01
Attending: EMERGENCY MEDICINE
Payer: OTHER MISCELLANEOUS

## 2024-01-01 VITALS
DIASTOLIC BLOOD PRESSURE: 95 MMHG | HEART RATE: 83 BPM | BODY MASS INDEX: 27.42 KG/M2 | WEIGHT: 154.76 LBS | RESPIRATION RATE: 18 BRPM | HEIGHT: 63 IN | TEMPERATURE: 98.3 F | OXYGEN SATURATION: 100 % | SYSTOLIC BLOOD PRESSURE: 134 MMHG

## 2024-01-01 DIAGNOSIS — S60.042A CONTUSION OF LEFT RING FINGER WITHOUT DAMAGE TO NAIL, INITIAL ENCOUNTER: ICD-10-CM

## 2024-01-01 PROCEDURE — 73140 X-RAY EXAM OF FINGER(S): CPT | Mod: LT

## 2024-01-01 PROCEDURE — 99283 EMERGENCY DEPT VISIT LOW MDM: CPT

## 2024-01-01 PROCEDURE — 250N000013 HC RX MED GY IP 250 OP 250 PS 637: Performed by: EMERGENCY MEDICINE

## 2024-01-01 RX ORDER — IBUPROFEN 600 MG/1
600 TABLET, FILM COATED ORAL ONCE
Status: COMPLETED | OUTPATIENT
Start: 2024-01-01 | End: 2024-01-01

## 2024-01-01 RX ADMIN — IBUPROFEN 600 MG: 600 TABLET, FILM COATED ORAL at 20:54

## 2024-01-01 ASSESSMENT — ACTIVITIES OF DAILY LIVING (ADL): ADLS_ACUITY_SCORE: 33

## 2024-01-02 NOTE — ED PROVIDER NOTES
"   History     Chief Complaint:  Hand Pain     The history is provided by the patient.      Lorena Conway is a 43 year old R hand dominant female presenting with hand pain after her L fourth finger was crushed by a three-tier cart while working at [employer]. Can still feel tip of finger was intially tingly.  Can still bend it.  No other injuries. Patient reports that they swelling has gone down since the incident.     Independent Historian:   Male  at bedside and corroborates the above.     Review of External Notes:   None     Medications:    Phentermine   Nortriptyline   Lorazepam   Topiramate  Sumatriptan   Naproxen     Past Medical History:    Asymptomatic varicose veins  Non morbid obesity  Plantar fasciitis    Past Surgical History:    Tubal ligation   Varicose vein surgery      Physical Exam   Patient Vitals for the past 24 hrs:   BP Temp Temp src Pulse Resp SpO2 Height Weight   01/01/24 2048 (!) 134/95 98.3  F (36.8  C) Temporal 83 18 100 % 1.6 m (5' 3\") 70.2 kg (154 lb 12.2 oz)      Physical Exam  General: Alert and oriented.    Head: Normocephalic.  External ears and nose normal.    Eyes: Pupils equal and round.  Normal tracking.    Pulmonary/Chest: Effort and rate normal    MSK:  Mild swelling and bruising to the middle and distal phalyx of L forth finger.  No subungual hematoma or bleeding or lacs. Normal ROM in all IP MCP joints.  No bruising or ttp to hand.    SKIN:  Warm and dry with strong radial pulse and cap refill <2 seconds    NEURO:  Normal strength of flexion and extension at MCP, PIP, and DIP joints.  Normal sensation to touch BL in fingers.    PSYCH:  Normal affect      Emergency Department Course     Imaging:  Fingers XR, 2-3 views, left   Final Result   IMPRESSION: Normal joint spaces and alignment. No fracture. Soft tissue edema about the third digit.            Laboratory:  Labs Ordered and Resulted from Time of ED Arrival to Time of ED Departure - No data to display     Emergency " Department Course & Assessments:    Interventions:  Medications   ibuprofen (ADVIL/MOTRIN) tablet 600 mg (600 mg Oral $Given 24)      Independent Interpretation (X-rays, CTs, rhythm strip):  I independently interpreted the finger XR, no fx or dislocation;    Assessments/Consultations/Discussion of Management or Tests:  ED Course as of 24 1344   Mon 2024   3390 I obtained the history and examined the patient as noted above.        Social Determinants of Health affecting care:   Employment.    Disposition:  The patient was discharged to home.     Impression & Plan      Medical Decision Makin yo female presents with crush injury to finger.  X_rays neg for fx or dislocation.  CMS intact.  No evidence of tendon or neurovascular injury.  No subungual hematoma.  No compartment syndrome.  Swelling improving.  My impression is contusion.  Discussed price pcp follow-up in 7-10 days if not improving and return precautions.    Diagnosis:    ICD-10-CM    1. Contusion of left ring finger without damage to nail, initial encounter  S60.042A          Discharge Medications:  Discharge Medication List as of 2024 10:59 PM         Scribe Disclosure:  I, [place], am serving as a scribe at 10:57 PM on 2024 to document services personally performed by Nickolas Baker PA-C based on my observations and the provider's statements to me.   2024   Nickolas Baker PA-C [place], Clark Ellsworth, PA-C  24 1409

## 2024-01-02 NOTE — ED TRIAGE NOTES
Pt was at work, was moving a cart with 3 tiers when a tier came out and her left ring finger got smashed.

## 2024-01-07 ENCOUNTER — MYC REFILL (OUTPATIENT)
Dept: FAMILY MEDICINE | Facility: CLINIC | Age: 44
End: 2024-01-07
Payer: COMMERCIAL

## 2024-01-07 DIAGNOSIS — E66.9 NON MORBID OBESITY: ICD-10-CM

## 2024-01-08 RX ORDER — PHENTERMINE HYDROCHLORIDE 37.5 MG/1
37.5 TABLET ORAL
Qty: 30 TABLET | Refills: 1 | OUTPATIENT
Start: 2024-01-08

## 2024-01-09 ENCOUNTER — ALLIED HEALTH/NURSE VISIT (OUTPATIENT)
Dept: FAMILY MEDICINE | Facility: CLINIC | Age: 44
End: 2024-01-09
Payer: COMMERCIAL

## 2024-01-09 VITALS — SYSTOLIC BLOOD PRESSURE: 124 MMHG | OXYGEN SATURATION: 98 % | DIASTOLIC BLOOD PRESSURE: 82 MMHG | HEART RATE: 88 BPM

## 2024-01-09 DIAGNOSIS — Z01.30 BP CHECK: Primary | ICD-10-CM

## 2024-01-09 PROCEDURE — 99207 PR NO CHARGE NURSE ONLY: CPT

## 2024-01-09 RX ORDER — PHENTERMINE HYDROCHLORIDE 37.5 MG/1
37.5 TABLET ORAL
Qty: 30 TABLET | Refills: 1 | OUTPATIENT
Start: 2024-01-09

## 2024-01-09 NOTE — TELEPHONE ENCOUNTER
Sent [employer] message requesting a call back for an appt. Two more attempts will be made.    Roseann Gamboa  Lead

## 2024-01-09 NOTE — PROGRESS NOTES
Lorena Conway is a 43 year old patient who comes in today for a Blood Pressure check.  Initial BP:  /82   Pulse 88   SpO2 98%      88  Disposition: follow-up as previously indicated by provider and results routed to provider.    Cece Dallas CMA

## 2024-01-09 NOTE — TELEPHONE ENCOUNTER
Notified Patient of provider's message.     MA/ to assist with scheduling follow up appointment for blood pressure check and refill phentermine.    Person was given an opportunity to ask questions, verbalized understanding of plan, and is agreeable.     Mary Lopez RN

## 2024-02-06 ENCOUNTER — OFFICE VISIT (OUTPATIENT)
Dept: FAMILY MEDICINE | Facility: CLINIC | Age: 44
End: 2024-02-06
Payer: COMMERCIAL

## 2024-02-06 VITALS
TEMPERATURE: 98.2 F | OXYGEN SATURATION: 100 % | HEIGHT: 64 IN | RESPIRATION RATE: 16 BRPM | SYSTOLIC BLOOD PRESSURE: 112 MMHG | WEIGHT: 148.5 LBS | DIASTOLIC BLOOD PRESSURE: 86 MMHG | HEART RATE: 77 BPM | BODY MASS INDEX: 25.35 KG/M2

## 2024-02-06 DIAGNOSIS — E66.9 NON MORBID OBESITY: Primary | ICD-10-CM

## 2024-02-06 DIAGNOSIS — Z12.31 VISIT FOR SCREENING MAMMOGRAM: ICD-10-CM

## 2024-02-06 LAB
ANION GAP SERPL CALCULATED.3IONS-SCNC: 9 MMOL/L (ref 7–15)
BUN SERPL-MCNC: 14.4 MG/DL (ref 6–20)
CALCIUM SERPL-MCNC: 9.6 MG/DL (ref 8.6–10)
CHLORIDE SERPL-SCNC: 104 MMOL/L (ref 98–107)
CHOLEST SERPL-MCNC: 170 MG/DL
CREAT SERPL-MCNC: 0.55 MG/DL (ref 0.51–0.95)
DEPRECATED HCO3 PLAS-SCNC: 26 MMOL/L (ref 22–29)
EGFRCR SERPLBLD CKD-EPI 2021: >90 ML/MIN/1.73M2
FASTING STATUS PATIENT QL REPORTED: YES
GLUCOSE SERPL-MCNC: 89 MG/DL (ref 70–99)
HBA1C MFR BLD: 5.6 % (ref 0–5.6)
HDLC SERPL-MCNC: 44 MG/DL
INSULIN SERPL-ACNC: 4.3 UU/ML (ref 2.6–24.9)
LDLC SERPL CALC-MCNC: 120 MG/DL
NONHDLC SERPL-MCNC: 126 MG/DL
POTASSIUM SERPL-SCNC: 4.7 MMOL/L (ref 3.4–5.3)
SODIUM SERPL-SCNC: 139 MMOL/L (ref 135–145)
TRIGL SERPL-MCNC: 30 MG/DL
TSH SERPL DL<=0.005 MIU/L-ACNC: 5.29 UIU/ML (ref 0.3–4.2)

## 2024-02-06 PROCEDURE — 80061 LIPID PANEL: CPT | Performed by: PHYSICIAN ASSISTANT

## 2024-02-06 PROCEDURE — 80048 BASIC METABOLIC PNL TOTAL CA: CPT | Performed by: PHYSICIAN ASSISTANT

## 2024-02-06 PROCEDURE — 36415 COLL VENOUS BLD VENIPUNCTURE: CPT | Performed by: PHYSICIAN ASSISTANT

## 2024-02-06 PROCEDURE — 84439 ASSAY OF FREE THYROXINE: CPT | Performed by: PHYSICIAN ASSISTANT

## 2024-02-06 PROCEDURE — 83525 ASSAY OF INSULIN: CPT | Performed by: PHYSICIAN ASSISTANT

## 2024-02-06 PROCEDURE — 83036 HEMOGLOBIN GLYCOSYLATED A1C: CPT | Performed by: PHYSICIAN ASSISTANT

## 2024-02-06 PROCEDURE — 84443 ASSAY THYROID STIM HORMONE: CPT | Performed by: PHYSICIAN ASSISTANT

## 2024-02-06 PROCEDURE — 99213 OFFICE O/P EST LOW 20 MIN: CPT | Performed by: PHYSICIAN ASSISTANT

## 2024-02-06 RX ORDER — PHENTERMINE HYDROCHLORIDE 37.5 MG/1
37.5 TABLET ORAL
Qty: 30 TABLET | Refills: 1 | Status: SHIPPED | OUTPATIENT
Start: 2024-02-06 | End: 2024-04-12

## 2024-02-06 ASSESSMENT — PAIN SCALES - GENERAL: PAINLEVEL: NO PAIN (0)

## 2024-02-06 NOTE — PROGRESS NOTES
"  Assessment & Plan     Non morbid obesity  Will refill again for occasional use and to wean off medication.  Discussed continuing with exercise and diet changes as well.  Will get labs done today from her physical last summer.  - phentermine (ADIPEX-P) 37.5 MG tablet; Take 1 tablet (37.5 mg) by mouth every morning (before breakfast)  - Lipid panel reflex to direct LDL Fasting; Future  - Hemoglobin A1c; Future  - Basic metabolic panel  (Ca, Cl, CO2, Creat, Gluc, K, Na, BUN); Future  - TSH with free T4 reflex; Future  - Hemoglobin A1c  - TSH with free T4 reflex  - Insulin level    Visit for screening mammogram  Agreeable to screening  - *MA Screening Digital Bilateral; Future          BMI  Estimated body mass index is 25.69 kg/m  as calculated from the following:    Height as of this encounter: 1.619 m (5' 3.75\").    Weight as of this encounter: 67.4 kg (148 lb 8 oz).   Weight management plan: Discussed healthy diet and exercise guidelines          Kika Carrillo is a 43 year old, presenting for the following health issues:  Recheck Medication (phentermine) and Weight Check        2/6/2024    10:15 AM   Additional Questions   Roomed by Lisa Magill, CMA   Accompanied by self         2/6/2024    10:15 AM   Patient Reported Additional Medications   Patient reports taking the following new medications NONE     History of Present Illness       Reason for visit:  General check up    She eats 2-3 servings of fruits and vegetables daily.She consumes 0 sweetened beverage(s) daily.She exercises with enough effort to increase her heart rate 30 to 60 minutes per day.  She exercises with enough effort to increase her heart rate 5 days per week.   She is taking medications regularly.         Medication Followup of phentermine   Taking Medication as prescribed: yes  Side Effects:  None  Medication Helping Symptoms:  yes      Wt Readings from Last 4 Encounters:   02/06/24 67.4 kg (148 lb 8 oz)   01/01/24 70.2 kg (154 lb 12.2 oz) " "  09/12/23 72.5 kg (159 lb 14.4 oz)   07/25/23 76.9 kg (169 lb 8 oz)     Lorena is doing great with phentermine.  Has lost quite a bit of weight since the summer.  Lost likely even more after having Covid in Dec.  She lost taste/smell along with her appetite during illness.  Doing better now but appetite is still suppressed.      Review of Systems  Constitutional, HEENT, cardiovascular, pulmonary, gi and gu systems are negative, except as otherwise noted.      Objective    /86 (BP Location: Right arm, Patient Position: Sitting, Cuff Size: Adult Regular)   Pulse 77   Temp 98.2  F (36.8  C) (Oral)   Resp 16   Ht 1.619 m (5' 3.75\")   Wt 67.4 kg (148 lb 8 oz)   SpO2 100%   BMI 25.69 kg/m    Body mass index is 25.69 kg/m .  Physical Exam   GENERAL: alert and no distress  NECK: no adenopathy, no asymmetry, masses, or scars  RESP: lungs clear to auscultation - no rales, rhonchi or wheezes  CV: regular rate and rhythm, normal S1 S2, no S3 or S4, no murmur, click or rub, no peripheral edema  MS: no gross musculoskeletal defects noted, no edema  PSYCH: mentation appears normal, affect normal/bright            Signed Electronically by: Trey Richardson PA-C    "

## 2024-02-06 NOTE — NURSING NOTE
"Chief Complaint   Patient presents with    Recheck Medication     phentermine    Weight Check     Initial /86 (BP Location: Right arm, Patient Position: Sitting, Cuff Size: Adult Regular)   Pulse 77   Temp 98.2  F (36.8  C) (Oral)   Resp 16   Ht 1.619 m (5' 3.75\")   Wt 67.4 kg (148 lb 8 oz)   SpO2 100%   BMI 25.69 kg/m   Estimated body mass index is 25.69 kg/m  as calculated from the following:    Height as of this encounter: 1.619 m (5' 3.75\").    Weight as of this encounter: 67.4 kg (148 lb 8 oz).  BP completed using cuff size regular long right arm    Lisa Magill, CMA    "

## 2024-02-07 LAB — T4 FREE SERPL-MCNC: 1.26 NG/DL (ref 0.9–1.7)

## 2024-02-08 ENCOUNTER — MYC REFILL (OUTPATIENT)
Dept: FAMILY MEDICINE | Facility: CLINIC | Age: 44
End: 2024-02-08
Payer: COMMERCIAL

## 2024-02-08 DIAGNOSIS — E66.9 NON MORBID OBESITY: ICD-10-CM

## 2024-02-09 RX ORDER — PHENTERMINE HYDROCHLORIDE 37.5 MG/1
37.5 TABLET ORAL
Qty: 30 TABLET | Refills: 1 | OUTPATIENT
Start: 2024-02-09

## 2024-04-07 ENCOUNTER — HEALTH MAINTENANCE LETTER (OUTPATIENT)
Age: 44
End: 2024-04-07

## 2024-04-12 ENCOUNTER — MYC REFILL (OUTPATIENT)
Dept: FAMILY MEDICINE | Facility: CLINIC | Age: 44
End: 2024-04-12
Payer: COMMERCIAL

## 2024-04-12 DIAGNOSIS — E66.9 NON MORBID OBESITY: ICD-10-CM

## 2024-04-14 RX ORDER — PHENTERMINE HYDROCHLORIDE 37.5 MG/1
1 TABLET ORAL
Qty: 30 TABLET | Refills: 1 | OUTPATIENT
Start: 2024-04-14

## 2024-04-14 RX ORDER — PHENTERMINE HYDROCHLORIDE 37.5 MG/1
37.5 TABLET ORAL
Qty: 30 TABLET | Refills: 1 | Status: SHIPPED | OUTPATIENT
Start: 2024-04-14 | End: 2024-06-13

## 2024-06-12 ENCOUNTER — MYC REFILL (OUTPATIENT)
Dept: FAMILY MEDICINE | Facility: CLINIC | Age: 44
End: 2024-06-12
Payer: COMMERCIAL

## 2024-06-12 DIAGNOSIS — E66.9 NON MORBID OBESITY: ICD-10-CM

## 2024-06-12 RX ORDER — PHENTERMINE HYDROCHLORIDE 37.5 MG/1
37.5 TABLET ORAL
Qty: 30 TABLET | Refills: 1 | OUTPATIENT
Start: 2024-06-12

## 2024-06-13 ENCOUNTER — VIRTUAL VISIT (OUTPATIENT)
Dept: FAMILY MEDICINE | Facility: CLINIC | Age: 44
End: 2024-06-13
Payer: COMMERCIAL

## 2024-06-13 DIAGNOSIS — E66.9 NON MORBID OBESITY: ICD-10-CM

## 2024-06-13 PROCEDURE — 99213 OFFICE O/P EST LOW 20 MIN: CPT | Mod: 95 | Performed by: PHYSICIAN ASSISTANT

## 2024-06-13 RX ORDER — PHENTERMINE HYDROCHLORIDE 37.5 MG/1
37.5 TABLET ORAL
Qty: 30 TABLET | Refills: 2 | Status: SHIPPED | OUTPATIENT
Start: 2024-06-13 | End: 2024-08-19

## 2024-06-13 NOTE — PROGRESS NOTES
Lorena is a 43 year old who is being evaluated via a billable video visit.    How would you like to obtain your AVS? MyChart  If the video visit is dropped, the invitation should be resent by: Text to cell phone: [phone]  Will anyone else be joining your video visit? No      Assessment & Plan     Non morbid obesity  Doing very well on phentermine for slightly less than a year now.  Making very good lifestyle changes as well.  Ok to continue x 6 months more then follow up.  - phentermine (ADIPEX-P) 37.5 MG tablet; Take 1 tablet (37.5 mg) by mouth every morning (before breakfast)          Subjective   Lorena is a 43 year old, presenting for the following health issues:  Recheck Medication        6/13/2024     3:05 PM   Additional Questions   Roomed by Xiao         6/13/2024     3:05 PM   Patient Reported Additional Medications   Patient reports taking the following new medications none     History of Present Illness       Reason for visit:  Follow up with medication    She eats 4 or more servings of fruits and vegetables daily.She consumes 0 sweetened beverage(s) daily.She exercises with enough effort to increase her heart rate 60 or more minutes per day.  She exercises with enough effort to increase her heart rate 4 days per week.   She is taking medications regularly.       Medication Followup of Phentermine  Taking Medication as prescribed: yes  Side Effects:  None  Medication Helping Symptoms:  yes  BP Readings from Last 6 Encounters:   02/06/24 112/86   01/09/24 124/82   01/01/24 (!) 134/95   09/12/23 130/80   07/25/23 110/80   11/02/18 123/80     Started on phentermine for weight loss last July.  Feels like it is still helping.  Made a lot of good diet and exercise changes      Wt Readings from Last 4 Encounters:   02/06/24 67.4 kg (148 lb 8 oz)   01/01/24 70.2 kg (154 lb 12.2 oz)   09/12/23 72.5 kg (159 lb 14.4 oz)   07/25/23 76.9 kg (169 lb 8 oz)             Review of Systems  Constitutional, HEENT,  cardiovascular, pulmonary, gi and gu systems are negative, except as otherwise noted.      Objective           Vitals:  No vitals were obtained today due to virtual visit.    Physical Exam   GENERAL: alert and no distress  EYES: Eyes grossly normal to inspection.  No discharge or erythema, or obvious scleral/conjunctival abnormalities.  RESP: No audible wheeze, cough, or visible cyanosis.    SKIN: Visible skin clear. No significant rash, abnormal pigmentation or lesions.  NEURO: Cranial nerves grossly intact.  Mentation and speech appropriate for age.  PSYCH: Appropriate affect, tone, and pace of words          Video-Visit Details    Type of service:  Video Visit   Originating Location (pt. Location): Home    Distant Location (provider location):  On-site  Platform used for Video Visit: Jenny  Signed Electronically by: Trey Richardson PA-C

## 2024-08-18 SDOH — HEALTH STABILITY: PHYSICAL HEALTH: ON AVERAGE, HOW MANY MINUTES DO YOU ENGAGE IN EXERCISE AT THIS LEVEL?: 60 MIN

## 2024-08-18 SDOH — HEALTH STABILITY: PHYSICAL HEALTH: ON AVERAGE, HOW MANY DAYS PER WEEK DO YOU ENGAGE IN MODERATE TO STRENUOUS EXERCISE (LIKE A BRISK WALK)?: 5 DAYS

## 2024-08-18 ASSESSMENT — SOCIAL DETERMINANTS OF HEALTH (SDOH): HOW OFTEN DO YOU GET TOGETHER WITH FRIENDS OR RELATIVES?: ONCE A WEEK

## 2024-08-19 ENCOUNTER — OFFICE VISIT (OUTPATIENT)
Dept: FAMILY MEDICINE | Facility: CLINIC | Age: 44
End: 2024-08-19
Attending: PHYSICIAN ASSISTANT
Payer: COMMERCIAL

## 2024-08-19 VITALS
RESPIRATION RATE: 15 BRPM | OXYGEN SATURATION: 100 % | BODY MASS INDEX: 25.57 KG/M2 | HEART RATE: 77 BPM | SYSTOLIC BLOOD PRESSURE: 112 MMHG | WEIGHT: 144.3 LBS | DIASTOLIC BLOOD PRESSURE: 70 MMHG | HEIGHT: 63 IN | TEMPERATURE: 98.8 F

## 2024-08-19 DIAGNOSIS — Z00.00 ROUTINE GENERAL MEDICAL EXAMINATION AT A HEALTH CARE FACILITY: Primary | ICD-10-CM

## 2024-08-19 DIAGNOSIS — R79.89 ELEVATED TSH: ICD-10-CM

## 2024-08-19 DIAGNOSIS — E66.9 NON MORBID OBESITY: ICD-10-CM

## 2024-08-19 PROCEDURE — 99213 OFFICE O/P EST LOW 20 MIN: CPT | Mod: 25 | Performed by: PHYSICIAN ASSISTANT

## 2024-08-19 PROCEDURE — 99396 PREV VISIT EST AGE 40-64: CPT | Performed by: PHYSICIAN ASSISTANT

## 2024-08-19 RX ORDER — PHENTERMINE HYDROCHLORIDE 37.5 MG/1
37.5 TABLET ORAL
Qty: 30 TABLET | Refills: 2 | Status: SHIPPED | OUTPATIENT
Start: 2024-08-19

## 2024-08-19 ASSESSMENT — PAIN SCALES - GENERAL: PAINLEVEL: NO PAIN (0)

## 2024-08-19 NOTE — PATIENT INSTRUCTIONS
Patient Education   Preventive Care Advice   This is general advice given by our system to help you stay healthy. However, your care team may have specific advice just for you. Please talk to your care team about your preventive care needs.  Nutrition  Eat 5 or more servings of fruits and vegetables each day.  Try wheat bread, brown rice and whole grain pasta (instead of white bread, rice, and pasta).  Get enough calcium and vitamin D. Check the label on foods and aim for 100% of the RDA (recommended daily allowance).  Lifestyle  Exercise at least 150 minutes each week  (30 minutes a day, 5 days a week).  Do muscle strengthening activities 2 days a week. These help control your weight and prevent disease.  No smoking.  Wear sunscreen to prevent skin cancer.  Have a dental exam and cleaning every 6 months.  Yearly exams  See your health care team every year to talk about:  Any changes in your health.  Any medicines your care team has prescribed.  Preventive care, family planning, and ways to prevent chronic diseases.  Shots (vaccines)   HPV shots (up to age 26), if you've never had them before.  Hepatitis B shots (up to age 59), if you've never had them before.  COVID-19 shot: Get this shot when it's due.  Flu shot: Get a flu shot every year.  Tetanus shot: Get a tetanus shot every 10 years.  Pneumococcal, hepatitis A, and RSV shots: Ask your care team if you need these based on your risk.  Shingles shot (for age 50 and up)  General health tests  Diabetes screening:  Starting at age 35, Get screened for diabetes at least every 3 years.  If you are younger than age 35, ask your care team if you should be screened for diabetes.  Cholesterol test: At age 39, start having a cholesterol test every 5 years, or more often if advised.  Bone density scan (DEXA): At age 50, ask your care team if you should have this scan for osteoporosis (brittle bones).  Hepatitis C: Get tested at least once in your life.  STIs (sexually  transmitted infections)  Before age 24: Ask your care team if you should be screened for STIs.  After age 24: Get screened for STIs if you're at risk. You are at risk for STIs (including HIV) if:  You are sexually active with more than one person.  You don't use condoms every time.  You or a partner was diagnosed with a sexually transmitted infection.  If you are at risk for HIV, ask about PrEP medicine to prevent HIV.  Get tested for HIV at least once in your life, whether you are at risk for HIV or not.  Cancer screening tests  Cervical cancer screening: If you have a cervix, begin getting regular cervical cancer screening tests starting at age 21.  Breast cancer scan (mammogram): If you've ever had breasts, begin having regular mammograms starting at age 40. This is a scan to check for breast cancer.  Colon cancer screening: It is important to start screening for colon cancer at age 45.  Have a colonoscopy test every 10 years (or more often if you're at risk) Or, ask your provider about stool tests like a FIT test every year or Cologuard test every 3 years.  To learn more about your testing options, visit:   .  For help making a decision, visit:   https://bit.ly/re02699.  Prostate cancer screening test: If you have a prostate, ask your care team if a prostate cancer screening test (PSA) at age 55 is right for you.  Lung cancer screening: If you are a current or former smoker ages 50 to 80, ask your care team if ongoing lung cancer screenings are right for you.  For informational purposes only. Not to replace the advice of your health care provider. Copyright   2023 [employer] All rights reserved. Clinically reviewed by the [facility]. Valuation App [id number] - REV 01/24.

## 2024-08-19 NOTE — PROGRESS NOTES
"Preventive Care Visit  [facility]  Trey Richardson PA-C, Family Medicine  Aug 19, 2024      Assessment & Plan     Routine general medical examination at a health care facility  Reviewed personal and family history. Reviewed age appropriate screenings. Reviewed healthy BP and BMI ranges. Counseled on lifestyle modifications for optimal mental and physical health.  Discussed age-appropriate health maintanence. Recommended any needed vaccinations. Continue to focus on well balanced diet and exercise   Didn't want mammo this year- will discuss again next year.    Non morbid obesity  She has had very good success on this.  Ok for ongoing at this time.  We did discuss that it may need to be addressed in the future (alt options or stopping med) but for now ok to refill.  Med check 6 months.  - phentermine (ADIPEX-P) 37.5 MG tablet; Take 1 tablet (37.5 mg) by mouth every morning (before breakfast)    Elevated TSH  Slight elevation in Feb.  Can recheck next winter or if symptoms develop.    Patient has been advised of split billing requirements and indicates understanding: Yes        BMI  Estimated body mass index is 25.72 kg/m  as calculated from the following:    Height as of this encounter: 1.595 m (5' 2.8\").    Weight as of this encounter: 65.5 kg (144 lb 4.8 oz).   Weight management plan: Discussed healthy diet and exercise guidelines    Counseling  Appropriate preventive services were addressed with this patient via screening, questionnaire, or discussion as appropriate for fall prevention, nutrition, physical activity, Tobacco-use cessation, social engagement, weight loss and cognition.  Checklist reviewing preventive services available has been given to the patient.  Reviewed patient's diet, addressing concerns and/or questions.           Kika Carrillo is a 44 year old, presenting for the following:  Physical        8/19/2024    12:28 PM   Additional Questions   Roomed by Asia ROBLES" LPN        Health Care Directive  Patient does not have a Health Care Directive or Living Will: Discussed advance care planning with patient; however, patient declined at this time.    HPI    Patient here for a physical and med check.  She has been on phentermine for about year now.  She has done very well with this and also made significant lifestyle changes.  She is happy with the progress.  No heart racing, sleep issues or BP concerns.    Wt Readings from Last 10 Encounters:   08/19/24 65.5 kg (144 lb 4.8 oz)   02/06/24 67.4 kg (148 lb 8 oz)   01/01/24 70.2 kg (154 lb 12.2 oz)   09/12/23 72.5 kg (159 lb 14.4 oz)   07/25/23 76.9 kg (169 lb 8 oz)   11/02/18 81.6 kg (180 lb)   01/31/18 77.1 kg (170 lb)   09/06/17 76.7 kg (169 lb)   04/12/17 79.4 kg (175 lb)             8/18/2024   General Health   How would you rate your overall physical health? Excellent   Feel stress (tense, anxious, or unable to sleep) Not at all            8/18/2024   Nutrition   Three or more servings of calcium each day? Yes   Diet: Low fat/cholesterol   How many servings of fruit and vegetables per day? 4 or more   How many sweetened beverages each day? 0-1            8/18/2024   Exercise   Days per week of moderate/strenous exercise 5 days   Average minutes spent exercising at this level 60 min            8/18/2024   Social Factors   Frequency of gathering with friends or relatives Once a week   Worry food won't last until get money to buy more No   Food not last or not have enough money for food? No   Do you have housing? (Housing is defined as stable permanent housing and does not include staying ouside in a car, in a tent, in an abandoned building, in an overnight shelter, or couch-surfing.) No   Are you worried about losing your housing? No   Lack of transportation? No   Unable to get utilities (heat,electricity)? No   Want help with housing or utility concern? No      (!) HOUSING CONCERN PRESENT      8/18/2024   Dental   Dentist two  times every year? Yes            8/18/2024   TB Screening   Were you born outside of the [place]? Yes              Today's PHQ-2 Score:       2/5/2024     9:04 AM   PHQ-2 ( 1999 [employer])   Q1: Little interest or pleasure in doing things 0   Q2: Feeling down, depressed or hopeless 0   PHQ-2 Score 0   Q1: Little interest or pleasure in doing things Not at all   Q2: Feeling down, depressed or hopeless Not at all   PHQ-2 Score 0         8/18/2024   Substance Use   Alcohol more than 3/day or more than 7/wk Not Applicable   Do you use any other substances recreationally? No        Social History     Tobacco Use    Smoking status: Never     Passive exposure: Never    Smokeless tobacco: Never   Vaping Use    Vaping status: Never Used   Substance Use Topics    Alcohol use: No    Drug use: No          Mammogram Screening - Mammogram every 1-2 years updated in Health Maintenance based on mutual decision making          8/18/2024   One time HIV Screening   Previous HIV test? No          8/18/2024   STI Screening   New sexual partner(s) since last STI/HIV test? No        History of abnormal Pap smear: No - age 30- 64 PAP with HPV every 5 years recommended        Latest Ref Rng & Units 7/25/2023    11:37 AM 12/9/2015    12:00 AM   PAP / HPV   PAP  Negative for Intraepithelial Lesion or Malignancy (NILM)     HPV 16 DNA Negative Negative     HPV 18 DNA Negative Negative     Other HR HPV Negative Negative     PAP-ABSTRACT   See Scanned Document           This result is from an external source.     ASCVD Risk   The 10-year ASCVD risk score (Gary LUO, et al., 2019) is: 0.6%    Values used to calculate the score:      Age: 44 years      Sex: Female      Is Non- : No      Diabetic: No      Tobacco smoker: No      Systolic Blood Pressure: 112 mmHg      Is BP treated: No      HDL Cholesterol: 44 mg/dL      Total Cholesterol: 170 mg/dL        8/18/2024   Contraception/Family Planning   Questions about  "contraception or family planning No           Reviewed and updated as needed this visit by Provider                      Review of Systems  Constitutional, HEENT, cardiovascular, pulmonary, gi and gu systems are negative, except as otherwise noted.     Objective    Exam  /70   Pulse 77   Temp 98.8  F (37.1  C) (Oral)   Resp 15   Ht 1.595 m (5' 2.8\")   Wt 65.5 kg (144 lb 4.8 oz)   SpO2 100%   BMI 25.72 kg/m     Estimated body mass index is 25.72 kg/m  as calculated from the following:    Height as of this encounter: 1.595 m (5' 2.8\").    Weight as of this encounter: 65.5 kg (144 lb 4.8 oz).    Physical Exam  GENERAL: alert and no distress  EYES: Eyes grossly normal to inspection, PERRL and conjunctivae and sclerae normal  HENT: ear canals and TM's normal, nose and mouth without ulcers or lesions  NECK: no adenopathy, no asymmetry, masses, or scars  RESP: lungs clear to auscultation - no rales, rhonchi or wheezes  CV: regular rate and rhythm, normal S1 S2, no S3 or S4, no murmur, click or rub, no peripheral edema  ABDOMEN: soft, nontender, no hepatosplenomegaly, no masses and bowel sounds normal  MS: no gross musculoskeletal defects noted, no edema  SKIN: no suspicious lesions or rashes  NEURO: Normal strength and tone, mentation intact and speech normal  PSYCH: mentation appears normal, affect normal/bright        Signed Electronically by: Trey Richardson PA-C    "

## 2024-12-10 DIAGNOSIS — E66.9 NON MORBID OBESITY: ICD-10-CM

## 2024-12-10 RX ORDER — PHENTERMINE HYDROCHLORIDE 37.5 MG/1
37.5 TABLET ORAL
Qty: 30 TABLET | Refills: 2 | Status: SHIPPED | OUTPATIENT
Start: 2024-12-10

## 2025-02-06 ENCOUNTER — TELEPHONE (OUTPATIENT)
Dept: FAMILY MEDICINE | Facility: CLINIC | Age: 45
End: 2025-02-06
Payer: COMMERCIAL

## 2025-02-06 NOTE — TELEPHONE ENCOUNTER
Prior Authorization Retail Medication Request    Medication/Dose: phentermine (ADIPEX-P) 37.5 MG tablet    Diagnosis and ICD code (if different than what is on RX):    New/renewal/insurance change PA/secondary ins. PA:  Previously Tried and Failed:    Rationale:      Insurance   Primary: RTE-UCARE/UCARE PMAP   Insurance ID:  [id number]     Secondary (if applicable):  Insurance ID:      Pharmacy Information (if different than what is on RX)  Name:    Phone:    Fax:    Clinic Information  Preferred routing pool for dept communication:

## 2025-02-12 NOTE — TELEPHONE ENCOUNTER
Retail Pharmacy Prior Authorization Team   Phone: [phone]    PA Initiation    Medication: PHENTERMINE HCL 37.5 MG PO TABS  Insurance Company: [employer] - Phone [phone] Fax [phone]  Pharmacy Filling the Rx: [id number] - [place], [place] - [address]  Filling Pharmacy Phone: [phone]  Filling Pharmacy Fax:    Start Date: 2/12/2025    [id number]

## 2025-02-13 ENCOUNTER — MYC MEDICAL ADVICE (OUTPATIENT)
Dept: FAMILY MEDICINE | Facility: CLINIC | Age: 45
End: 2025-02-13
Payer: COMMERCIAL

## 2025-02-13 NOTE — TELEPHONE ENCOUNTER
PRIOR AUTHORIZATION DENIED    Medication: PHENTERMINE HCL 37.5 MG PO TABS  Insurance Company: [employer] - Phone [phone] Fax [phone]  Denial Date: 2/12/2025  Denial Reason(s):             Appeal Information:         Patient Notified: No

## 2025-02-18 ENCOUNTER — TELEPHONE (OUTPATIENT)
Dept: FAMILY MEDICINE | Facility: CLINIC | Age: 45
End: 2025-02-18

## 2025-02-18 ENCOUNTER — VIRTUAL VISIT (OUTPATIENT)
Dept: FAMILY MEDICINE | Facility: CLINIC | Age: 45
End: 2025-02-18
Payer: COMMERCIAL

## 2025-02-18 DIAGNOSIS — E66.9 NON MORBID OBESITY: Primary | ICD-10-CM

## 2025-02-18 RX ORDER — PHENTERMINE HYDROCHLORIDE 37.5 MG/1
37.5 TABLET ORAL
Qty: 30 TABLET | Refills: 2 | Status: SHIPPED | OUTPATIENT
Start: 2025-02-18

## 2025-02-18 RX ORDER — TOPIRAMATE 25 MG/1
TABLET, FILM COATED ORAL
Qty: 60 TABLET | Refills: 1 | Status: SHIPPED | OUTPATIENT
Start: 2025-02-18

## 2025-02-18 NOTE — PROGRESS NOTES
"Lorena is a 44 year old who is being evaluated via a billable video visit.    How would you like to obtain your AVS? MyChart  If the video visit is dropped, the invitation should be resent by: Text to cell phone: [phone]  Will anyone else be joining your video visit? No      Assessment & Plan     Non morbid obesity  Patient has done quite well on phentermine.  May be hitting a plateau now with treatment.  She continues with good diet and exercise habits.  Will add topiramate for extra weight management.  Taper up as tolerated.  Next visit in person 3-6 months.  Discussed mechanism of action of medication, proper dosing, potential side effects and appropriate follow up.    - phentermine (ADIPEX-P) 37.5 MG tablet; Take 1 tablet (37.5 mg) by mouth every morning (before breakfast).  - topiramate (TOPAMAX) 25 MG tablet; 1 tab at bedtime for 1 week, then 1 tab BID for 1 week, then 2 tabs at bedtime and 1 tab qam for 1 week, then 2 tabs BID          BMI  Estimated body mass index is 25.72 kg/m  as calculated from the following:    Height as of 8/19/24: 1.595 m (5' 2.8\").    Weight as of 8/19/24: 65.5 kg (144 lb 4.8 oz).   Weight management plan: Discussed healthy diet and exercise guidelines      The longitudinal plan of care for the diagnosis(es)/condition(s) as documented were addressed during this visit. Due to the added complexity in care, I will continue to support Lorena in the subsequent management and with ongoing continuity of care.      Subjective   Lorena is a 44 year old, presenting for the following health issues:  Recheck Medication      2/18/2025    12:48 PM   Additional Questions   Roomed by Nevaeh ROBLES     History of Present Illness       Reason for visit:  Check up   She is taking medications regularly.     Phentermine  Medication Followup of Phentermine  Taking Medication as prescribed: yes  Side Effects:  None  Medication Helping Symptoms:  yes    Follow up of phentermine  Doing well overall.  Noticing " Started on Wednesday with fever. Wheezing started last night.    wearing off in evening  Takes medication already at like 2-3pm every day  Doesn't eat a lot generally in the AM    Weight  currently 143  Started at 176          Review of Systems  Constitutional, HEENT, cardiovascular, pulmonary, gi and gu systems are negative, except as otherwise noted.      Objective    Vitals - Patient Reported  Weight (Patient Reported): 64.9 kg (143 lb)  Pain Score: No Pain (0)        Physical Exam   GENERAL: alert and no distress  EYES: Eyes grossly normal to inspection.  No discharge or erythema, or obvious scleral/conjunctival abnormalities.  RESP: No audible wheeze, cough, or visible cyanosis.    SKIN: Visible skin clear. No significant rash, abnormal pigmentation or lesions.  NEURO: Cranial nerves grossly intact.  Mentation and speech appropriate for age.  PSYCH: Appropriate affect, tone, and pace of words          Video-Visit Details    Type of service:  Video Visit   Originating Location (pt. Location): Home    Distant Location (provider location):  On-site  Platform used for Video Visit: Jenny  Signed Electronically by: Trey Richardson PA-C

## 2025-02-18 NOTE — TELEPHONE ENCOUNTER
LMTCB-I believe patient is already aware of this but can inform her PA denied, can pay out of pocket for this. Also sent  informing pt.    Gabrielle Daniels RN on 2/18/2025 at 3:30 PM

## 2025-02-18 NOTE — TELEPHONE ENCOUNTER
PRIOR AUTHORIZATION DENIED    Medication: PHENTERMINE HCL 37.5 MG PO TABS  Insurance Company: [facility] [employer] ([employer] - Phone [phone] Fax [phone]  Denial Date: 2/18/2025  Denial Reason(s):     Appeal Information:     Patient Notified: No

## 2025-02-19 NOTE — TELEPHONE ENCOUNTER
Pt has read Mantex message and responded. Closing encounter    Lisette Daigle RN on 2/19/2025 at 10:44 AM

## 2025-03-07 ENCOUNTER — TELEPHONE (OUTPATIENT)
Dept: FAMILY MEDICINE | Facility: CLINIC | Age: 45
End: 2025-03-07
Payer: COMMERCIAL

## 2025-03-09 NOTE — TELEPHONE ENCOUNTER
Retail Pharmacy Prior Authorization Team  Phone: [phone]    PRIOR AUTHORIZATION DENIED    Medication: QSYMIA 3.75-23 MG PO CP24  Insurance Company: [employer] [phone] Fax [phone]  Denial Date: 3/7/2025  Denial Reason(s):         Appeal Information:         Patient Notified: NO- Unfortunately, we cannot call the patient with denials because we do not know what next steps the MD will take nor can we give medical advice, please notify the patient of what they are to expect for the continuation of their therapy from the provider.

## 2025-03-10 NOTE — TELEPHONE ENCOUNTER
Her initial BMI when we started weight loss treatment in 7/2023 was 30.    Can we resubmit the PA please?      Trey

## 2025-03-11 NOTE — TELEPHONE ENCOUNTER
Retail Pharmacy Prior Authorization Team  Phone: [phone]    Resubmitted via EPA with chart notes from 07/2023

## 2025-03-12 NOTE — TELEPHONE ENCOUNTER
Retail Pharmacy Prior Authorization Team  Phone: [phone]    RECEIVED FORM FROM INSURANCE REQUESTING CHART NOTES- WHICH WERE ATTACHED TO THE EPA.     FAXING TO THEM  [phone]

## 2025-03-12 NOTE — TELEPHONE ENCOUNTER
Retail Pharmacy Prior Authorization Team  Phone: [phone]    MEDICATION APPEAL APPROVED    Medication: QSYMIA 3.75-23 MG PO CP24  Authorization Effective Date: 3/12/2025  Authorization Expiration Date: 6/12/2025  Approved Dose/Quantity:   Reference #:     Appeal Insurance Company:   Expected CoPay: $       CoPay Card Available:    Financial Assistance Needed:   Filling Pharmacy:    Patient Notified: YES  Comments:

## 2025-03-16 ENCOUNTER — MYC MEDICAL ADVICE (OUTPATIENT)
Dept: FAMILY MEDICINE | Facility: CLINIC | Age: 45
End: 2025-03-16
Payer: COMMERCIAL

## 2025-03-18 ENCOUNTER — MYC REFILL (OUTPATIENT)
Dept: FAMILY MEDICINE | Facility: CLINIC | Age: 45
End: 2025-03-18
Payer: COMMERCIAL

## 2025-03-18 DIAGNOSIS — E66.9 NON MORBID OBESITY: ICD-10-CM

## 2025-03-18 RX ORDER — PHENTERMINE HYDROCHLORIDE 37.5 MG/1
37.5 TABLET ORAL
Qty: 30 TABLET | Refills: 2 | OUTPATIENT
Start: 2025-03-18

## 2025-03-20 NOTE — TELEPHONE ENCOUNTER
Please see MC and advise.  Pt requesting alternative medication.    Alesha Carvajal RN, BSN  [facility]

## 2025-03-20 NOTE — TELEPHONE ENCOUNTER
Abyz message sent to patient with provider response.     Maritza Lyle RN 3/20/2025 9:45 AM  [facility]

## 2025-03-24 ENCOUNTER — TELEPHONE (OUTPATIENT)
Dept: FAMILY MEDICINE | Facility: CLINIC | Age: 45
End: 2025-03-24
Payer: COMMERCIAL

## 2025-03-24 DIAGNOSIS — E66.9 NON MORBID OBESITY: Primary | ICD-10-CM

## 2025-03-24 RX ORDER — PHENTERMINE HYDROCHLORIDE 37.5 MG/1
37.5 TABLET ORAL
Qty: 30 TABLET | Refills: 2 | Status: SHIPPED | OUTPATIENT
Start: 2025-03-24

## 2025-03-24 NOTE — TELEPHONE ENCOUNTER
Ok, that is fine.  I can send in phentermine for her again.  I'm sorry that the other one didn't work out.      Trey

## 2025-03-24 NOTE — TELEPHONE ENCOUNTER
QSYMIA 3.75 MG-23 MG CAPSULE     ALTERNATIVE REQUESTED-TOO EXPENSIVE; PT WANTS TO STAY ON PHENTERAMINE

## 2025-04-09 ENCOUNTER — TELEPHONE (OUTPATIENT)
Dept: FAMILY MEDICINE | Facility: CLINIC | Age: 45
End: 2025-04-09
Payer: COMMERCIAL

## 2025-04-09 NOTE — TELEPHONE ENCOUNTER
Pt calls.    She is having a hard time filling her prescription - it is not going through insurance - it needs a PA - she said it is for the phentermine.      See telephone encounter from 3/7/25 - PA was approved after med appeal.    Advised she should check with her pharmacy as it looks like it is approved now.      See other telephone call for 4/9/25.      Above was really for phentermine-topiramate which is too expensive for the pt.    See telephone call of 2/18/25 with prior auth for phentermine which was denied - looks like they only cover for 3 months.

## 2025-06-03 ENCOUNTER — OFFICE VISIT (OUTPATIENT)
Dept: FAMILY MEDICINE | Facility: CLINIC | Age: 45
End: 2025-06-03
Payer: COMMERCIAL

## 2025-06-03 VITALS
BODY MASS INDEX: 26.33 KG/M2 | OXYGEN SATURATION: 99 % | DIASTOLIC BLOOD PRESSURE: 69 MMHG | WEIGHT: 143.1 LBS | HEIGHT: 62 IN | HEART RATE: 74 BPM | SYSTOLIC BLOOD PRESSURE: 102 MMHG | RESPIRATION RATE: 20 BRPM | TEMPERATURE: 98.9 F

## 2025-06-03 DIAGNOSIS — E66.9 NON MORBID OBESITY: Primary | ICD-10-CM

## 2025-06-03 PROCEDURE — 99213 OFFICE O/P EST LOW 20 MIN: CPT | Performed by: PHYSICIAN ASSISTANT

## 2025-06-03 PROCEDURE — 3078F DIAST BP <80 MM HG: CPT | Performed by: PHYSICIAN ASSISTANT

## 2025-06-03 PROCEDURE — 3074F SYST BP LT 130 MM HG: CPT | Performed by: PHYSICIAN ASSISTANT

## 2025-06-03 PROCEDURE — 1126F AMNT PAIN NOTED NONE PRSNT: CPT | Performed by: PHYSICIAN ASSISTANT

## 2025-06-03 PROCEDURE — G2211 COMPLEX E/M VISIT ADD ON: HCPCS | Performed by: PHYSICIAN ASSISTANT

## 2025-06-03 RX ORDER — PHENTERMINE HYDROCHLORIDE 37.5 MG/1
37.5 TABLET ORAL
Qty: 30 TABLET | Refills: 2 | Status: SHIPPED | OUTPATIENT
Start: 2025-06-03

## 2025-06-03 RX ORDER — NALTREXONE HYDROCHLORIDE 50 MG/1
25 TABLET, FILM COATED ORAL DAILY
Qty: 45 TABLET | Refills: 1 | Status: SHIPPED | OUTPATIENT
Start: 2025-06-03

## 2025-06-03 ASSESSMENT — PAIN SCALES - GENERAL: PAINLEVEL_OUTOF10: NO PAIN (0)

## 2025-06-03 NOTE — PROGRESS NOTES
Assessment & Plan     Non morbid obesity:  - Weight management is ongoing with phentermine. Patient has made significant lifestyle changes and is satisfied with current progress but desires an additional 5-10 lb weight loss. Combination of Topamax and phentermine was previously tried but was expensive and not well tolerated. Naltrexone is considered as an adjunct to phentermine to help with weight management.  - Continue phentermine. Start naltrexone at 25 mg, with instructions to cut a 50 mg tablet in half. No specific follow-up visit required for this change, but a follow-up in 6 months for phentermine is planned.    Consent was obtained from the patient to use an AI documentation tool in the creation of this note.        The longitudinal plan of care for the diagnosis(es)/condition(s) as documented were addressed during this visit. Due to the added complexity in care, I will continue to support Lorena in the subsequent management and with ongoing continuity of care.        Kika Carrillo is a 44 year old, presenting for the following health issues:  Recheck Medication (phentermine), Weight Management, and Formulary Issue (phentermine)        6/3/2025     9:21 AM   Additional Questions   Roomed by Lisa Magill, CMA   Accompanied by self         6/3/2025     9:21 AM   Patient Reported Additional Medications   Patient reports taking the following new medications none     History of Present Illness       Reason for visit:  Check up on my health    She eats 4 or more servings of fruits and vegetables daily.She consumes 0 sweetened beverage(s) daily.She exercises with enough effort to increase her heart rate 60 or more minutes per day.  She exercises with enough effort to increase her heart rate 5 days per week.   She is taking medications regularly.          Medication Followup of phentermine 37.5 mg  Taking Medication as prescribed: yes  Side Effects:  None  Medication Helping Symptoms:  yes    Weight Management  -  "Currently taking phentermine for weight loss.  - Maintained weight loss routine from [place], including oatmeal for breakfast and throughout the day if hungry.  - Consuming lots of fruits, particularly strawberries and oranges.  - No side effects reported from phentermine.  - Previously tried Topamax in combination with phentermine, but it was not well tolerated.  - Expresses desire to lose an additional 5 to 10 pounds.  - Noted significant weight loss, with clothes becoming too large.            Objective    /69 (BP Location: Right arm, Patient Position: Sitting, Cuff Size: Adult Regular)   Pulse 74   Temp 98.9  F (37.2  C) (Oral)   Resp 20   Ht 1.575 m (5' 2\")   Wt 64.9 kg (143 lb 1.6 oz)   SpO2 99%   BMI 26.17 kg/m    Body mass index is 26.17 kg/m .  Physical Exam   GENERAL: alert and no distress  RESP: lungs clear to auscultation - no rales, rhonchi or wheezes  CV: regular rate and rhythm, normal S1 S2, no S3 or S4, no murmur, click or rub, no peripheral edema  MS: no gross musculoskeletal defects noted, no edema  SKIN: no suspicious lesions or rashes  PSYCH: mentation appears normal, affect normal/bright            Signed Electronically by: Trey Richardson PA-C    " ambulatory

## 2025-06-03 NOTE — NURSING NOTE
"Chief Complaint   Patient presents with    Recheck Medication     phentermine    Weight Management    Formulary Issue     phentermine     Initial /69 (BP Location: Right arm, Patient Position: Sitting, Cuff Size: Adult Regular)   Pulse 74   Temp 98.9  F (37.2  C) (Oral)   Resp 20   Ht 1.575 m (5' 2\")   Wt 64.9 kg (143 lb 1.6 oz)   SpO2 99%   BMI 26.17 kg/m   Estimated body mass index is 26.17 kg/m  as calculated from the following:    Height as of this encounter: 1.575 m (5' 2\").    Weight as of this encounter: 64.9 kg (143 lb 1.6 oz).  BP completed using cuff size regular right arm    Lisa Magill, CMA    "

## 2025-07-14 ENCOUNTER — E-VISIT (OUTPATIENT)
Dept: FAMILY MEDICINE | Facility: CLINIC | Age: 45
End: 2025-07-14
Payer: COMMERCIAL

## 2025-07-14 DIAGNOSIS — Z53.9 ERRONEOUS ENCOUNTER--DISREGARD: Primary | ICD-10-CM

## 2025-07-14 DIAGNOSIS — E66.9 NON MORBID OBESITY: Primary | ICD-10-CM

## 2025-07-14 PROCEDURE — 99207 PR NON-BILLABLE SERV PER CHARTING: CPT | Performed by: PHYSICIAN ASSISTANT

## 2025-07-14 NOTE — PATIENT INSTRUCTIONS
Thank you for choosing us for your care. o ahead and get that virtual visit scheduled when you can and we will talk about this med.    You can schedule an appointment by clicking here in [facility], or call [phone].

## 2025-07-17 NOTE — TELEPHONE ENCOUNTER
I think she misunderstood me when I said we need a video visit.  She is asking for a new controlled substance so I do need to speak with her.  She submitted this twice now.    Please call patient to discuss.      Trey  
Spoke with patient.  VV scheduled.    No further action needed.    Nessa Hays     
minimum assist (75% patients effort)

## 2025-07-21 ENCOUNTER — PATIENT OUTREACH (OUTPATIENT)
Dept: CARE COORDINATION | Facility: CLINIC | Age: 45
End: 2025-07-21
Payer: COMMERCIAL

## 2025-07-21 NOTE — PATIENT INSTRUCTIONS
Thank you for choosing us for your care. I think an in-clinic or virtual visit would be the best next step based on your symptoms. Please schedule a clinic appointment; you won t be charged for this eVisit.      You can schedule an appointment by clicking here in [facility], or call [phone].

## 2025-07-23 ENCOUNTER — VIRTUAL VISIT (OUTPATIENT)
Dept: FAMILY MEDICINE | Facility: CLINIC | Age: 45
End: 2025-07-23
Payer: COMMERCIAL

## 2025-07-23 DIAGNOSIS — E66.9 NON MORBID OBESITY: Primary | ICD-10-CM

## 2025-07-23 PROCEDURE — 98005 SYNCH AUDIO-VIDEO EST LOW 20: CPT | Performed by: PHYSICIAN ASSISTANT

## 2025-07-23 RX ORDER — LISDEXAMFETAMINE DIMESYLATE 30 MG/1
30 CAPSULE ORAL EVERY MORNING
Qty: 30 CAPSULE | Refills: 0 | Status: SHIPPED | OUTPATIENT
Start: 2025-07-23

## 2025-07-23 NOTE — PROGRESS NOTES
Lorena is a 45 year old who is being evaluated via a billable video visit.    How would you like to obtain your AVS? MyChart  If the video visit is dropped, the invitation should be resent by: Text to cell phone: [phone]  Will anyone else be joining your video visit? No      Assessment & Plan     Non morbid obesity:  - Initiate Vyvanse treatment starting at 30 mg to manage weight. Consider increasing the dose in increments of 10 or 20mg up to 70 mg based on response. Monitor progress and adjust dosage as needed. Option to submit an E visit for dose adjustment if no issues arise.  - Discussed mechanism of action of medication, proper dosing, potential side effects and appropriate follow up.      Consent was obtained from the patient to use an AI documentation tool in the creation of this note.    The longitudinal plan of care for the diagnosis(es)/condition(s) as documented were addressed during this visit. Due to the added complexity in care, I will continue to support Lorena in the subsequent management and with ongoing continuity of care.    Subjective   Lorena is a 45 year old, presenting for the following health issues:  Recheck Medication    History of Present Illness       Reason for visit:  New medication   She is taking medications regularly.      Requesting a prescription for lisdexamfetamine     Wt Readings from Last 10 Encounters:   06/03/25 64.9 kg (143 lb 1.6 oz)   08/19/24 65.5 kg (144 lb 4.8 oz)   02/06/24 67.4 kg (148 lb 8 oz)   01/01/24 70.2 kg (154 lb 12.2 oz)   09/12/23 72.5 kg (159 lb 14.4 oz)   07/25/23 76.9 kg (169 lb 8 oz)   11/02/18 81.6 kg (180 lb)   01/31/18 77.1 kg (170 lb)   09/06/17 76.7 kg (169 lb)   04/12/17 79.4 kg (175 lb)     - Has been using phentermine for weight management but has plateaued.  Overall has done very well on this.  Still wants to lose about 10 more lbs.  - Grazes in the afternoon, especially when phentermine wears off.    - her daughter was started on vyvanse by a  pediatric weight specialist recently so she is wondering if this is an option for her or not.            Objective           Vitals:  No vitals were obtained today due to virtual visit.    Physical Exam   GENERAL: alert and no distress  EYES: Eyes grossly normal to inspection.  No discharge or erythema, or obvious scleral/conjunctival abnormalities.  RESP: No audible wheeze, cough, or visible cyanosis.    SKIN: Visible skin clear. No significant rash, abnormal pigmentation or lesions.  NEURO: Cranial nerves grossly intact.  Mentation and speech appropriate for age.  PSYCH: Appropriate affect, tone, and pace of words          Video-Visit Details    Type of service:  Video Visit   Originating Location (pt. Location): Home    Distant Location (provider location):  Off-site  Platform used for Video Visit: Jenny  Signed Electronically by: Trey Richardson PA-C